# Patient Record
Sex: MALE | Race: WHITE | NOT HISPANIC OR LATINO | Employment: UNEMPLOYED | URBAN - METROPOLITAN AREA
[De-identification: names, ages, dates, MRNs, and addresses within clinical notes are randomized per-mention and may not be internally consistent; named-entity substitution may affect disease eponyms.]

---

## 2021-10-14 ENCOUNTER — HOSPITAL ENCOUNTER (OUTPATIENT)
Dept: RADIOLOGY | Facility: MEDICAL CENTER | Age: 46
End: 2021-10-14
Attending: ORTHOPAEDIC SURGERY
Payer: MEDICARE

## 2021-10-14 DIAGNOSIS — Q66.89 CLUBFOOT OF BOTH LOWER EXTREMITIES: ICD-10-CM

## 2021-10-14 DIAGNOSIS — M25.571 CHRONIC PAIN OF RIGHT ANKLE: ICD-10-CM

## 2021-10-14 DIAGNOSIS — G89.29 CHRONIC PAIN OF RIGHT ANKLE: ICD-10-CM

## 2021-10-14 PROCEDURE — 73721 MRI JNT OF LWR EXTRE W/O DYE: CPT | Mod: RT,MG

## 2021-10-14 PROCEDURE — 73700 CT LOWER EXTREMITY W/O DYE: CPT | Mod: RT,MG

## 2021-10-18 PROBLEM — Q66.89 CLUBFOOT OF RIGHT LOWER EXTREMITY: Status: ACTIVE | Noted: 2021-10-18

## 2021-10-26 ENCOUNTER — APPOINTMENT (OUTPATIENT)
Dept: ADMISSIONS | Facility: MEDICAL CENTER | Age: 46
End: 2021-10-26
Attending: ORTHOPAEDIC SURGERY
Payer: MEDICARE

## 2021-10-27 NOTE — DISCHARGE PLANNING
DISCHARGE PLANNING NOTE -    Procedure: Procedure(s):  ARTHROSCOPY, ANKLE  OSTEOTOMY-TIBIA/TALUS EXICISON, CALCANEAL SLIDE, TALONAVICULAR, CALCANEALCUBOID  BONE GRAFT- AND ALLOGRAFT  LENGTHENING, TENDON-TENDOACHILLES LENGHTENING  REMOVAL, HARDWARE-SUBTALAR/TALONAVICULAR/CALCANEALCUBOID JOINTS  Procedure Date: 11/9/2021  Insurance: Payor: MEDICARE / Plan: MEDICARE PART A & B / Product Type: *No Product type* /    Equipment currently available at home?  front-wheel walker  Steps into the home? 2  Steps within the home? 0  Toilet height? ADA  Type of shower? tub-shower  Who will be with you during your recovery? spouse  Is Outpatient Physical Therapy set up after surgery? No   Planning same day discharge?Yes     Plan: RN CM met with patient for preadmission appointment. He states he is unsure how long he may be NWB, but reports could be 2-6 weeks. RN CM suggested knee scooter, but patient states that he has been NWB before and feels most comfortable using the walker he has at home because of his balance issues. Equipment list and home safety checklist reviewed with patient. Anticipate patient will discharge home with spouse.

## 2021-10-27 NOTE — H&P (VIEW-ONLY)
Subjective   Patient ID:  Irene Figueroa is a 45 y.o. male.    Chief Complaint:  Follow-Up of the Right Ankle    Last Surgery: No surgery found on No surgery found    HPI Henrique is here for preoperative visit, he does have some questions about surgery.  He would like to have his great MTP plate out as well.  No other significant changes.    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  Neurovascularly intact throughout.  He does have lateral instability.    Last Imaging Result(s):   MR-ANKLE W/O RIGHT  Narrative: 10/14/2021 5:05 PM    HISTORY/REASON FOR EXAM: Surgical repairs remotely for clubfoot. pain; metal in bilateral feet/ankles.    TECHNIQUE/EXAM DESCRIPTION:  MRI of the RIGHT ankle without contrast.    Using a [employer] Signa 1.5 Denise MRI scanner, T1 axial and sagittal, fast spin-echo T2 fat-suppressed axial and coronal, and fast inversion recovery sagittal images were obtained.    COMPARISON: CT same day    FINDINGS:  There is some artifact related to hindfoot fusion hardware with mature subtalar, calcaneal cuboid and talonavicular fusion confirmed.    Osseous structures/cartilaginous surfaces: There is severe tibiotalar cartilage thinning medially, moderate laterally. There is subchondral edema and cyst formation in the tibial plafond. There are moderate tibiotalar spurs which would be expected to   limit range of motion with dorsi flexion. There is a small anterior ossific loose body, 4 mm    Ligaments/tendons: The anterior talofibular, calcaneofibular, and posterior talofibular ligaments are intact. The deltoid ligament is intact.    The extensor tendons, tibialis posterior, flexor digitorum longus, flexor hallucis longus, and peroneal tendons are intact but there is fatty atrophy of the flexor tendons.    The Achilles' tendon is intact but there is mild fusiform noninsertional thickening  Impression: Subtalar, calcaneocuboid and talonavicular  mature fusion with hardware in place    Moderate severity tibiotalar osteoarthritis    Muscular fatty atrophy affecting the flexors of the ankle    Mild Achilles tendinopathy      Assessment & Plan   Encounter Diagnoses:   No diagnosis found.    No orders of the defined types were placed in this encounter.    Very pleasant 45-year-old gentleman we have plans for a cavus reconstruction with Calc slide osteotomy and derotation through his midfoot.  He would also like to have all hardware removed including his great MTP plate.  I think partial excision of the talus laterally with lateral ligament reconstruction is indicated as well.  He is scheduled for November 9.  I am looking forward to seeing him at that time.  Return for Post-Op, Imaging.

## 2021-11-05 ENCOUNTER — PRE-ADMISSION TESTING (OUTPATIENT)
Dept: ADMISSIONS | Facility: MEDICAL CENTER | Age: 46
End: 2021-11-05
Attending: ORTHOPAEDIC SURGERY
Payer: MEDICARE

## 2021-11-05 DIAGNOSIS — Z01.812 PRE-OPERATIVE LABORATORY EXAMINATION: ICD-10-CM

## 2021-11-05 PROCEDURE — U0003 INFECTIOUS AGENT DETECTION BY NUCLEIC ACID (DNA OR RNA); SEVERE ACUTE RESPIRATORY SYNDROME CORONAVIRUS 2 (SARS-COV-2) (CORONAVIRUS DISEASE [COVID-19]), AMPLIFIED PROBE TECHNIQUE, MAKING USE OF HIGH THROUGHPUT TECHNOLOGIES AS DESCRIBED BY CMS-2020-01-R: HCPCS

## 2021-11-05 PROCEDURE — U0005 INFEC AGEN DETEC AMPLI PROBE: HCPCS

## 2021-11-06 LAB
COVID ORDER STATUS COVID19: NORMAL
SARS-COV-2 RNA RESP QL NAA+PROBE: NOTDETECTED
SPECIMEN SOURCE: NORMAL

## 2021-11-09 ENCOUNTER — HOSPITAL ENCOUNTER (OUTPATIENT)
Facility: MEDICAL CENTER | Age: 46
End: 2021-11-09
Attending: ORTHOPAEDIC SURGERY | Admitting: ORTHOPAEDIC SURGERY
Payer: MEDICARE

## 2021-11-09 ENCOUNTER — ANESTHESIA EVENT (OUTPATIENT)
Dept: SURGERY | Facility: MEDICAL CENTER | Age: 46
End: 2021-11-09
Payer: MEDICARE

## 2021-11-09 ENCOUNTER — APPOINTMENT (OUTPATIENT)
Dept: RADIOLOGY | Facility: MEDICAL CENTER | Age: 46
End: 2021-11-09
Attending: ORTHOPAEDIC SURGERY
Payer: MEDICARE

## 2021-11-09 ENCOUNTER — ANESTHESIA (OUTPATIENT)
Dept: SURGERY | Facility: MEDICAL CENTER | Age: 46
End: 2021-11-09
Payer: MEDICARE

## 2021-11-09 VITALS
BODY MASS INDEX: 30.56 KG/M2 | HEART RATE: 68 BPM | RESPIRATION RATE: 21 BRPM | HEIGHT: 69 IN | DIASTOLIC BLOOD PRESSURE: 71 MMHG | SYSTOLIC BLOOD PRESSURE: 122 MMHG | TEMPERATURE: 97.5 F | WEIGHT: 206.35 LBS | OXYGEN SATURATION: 97 %

## 2021-11-09 DIAGNOSIS — Q66.89 CLUBFOOT OF RIGHT LOWER EXTREMITY: ICD-10-CM

## 2021-11-09 PROCEDURE — 28300 INCISION OF HEEL BONE: CPT | Mod: 59 | Performed by: ORTHOPAEDIC SURGERY

## 2021-11-09 PROCEDURE — 64447 NJX AA&/STRD FEMORAL NRV IMG: CPT | Performed by: ORTHOPAEDIC SURGERY

## 2021-11-09 PROCEDURE — 500881 HCHG PACK, EXTREMITY: Performed by: ORTHOPAEDIC SURGERY

## 2021-11-09 PROCEDURE — 27680 RELEASE OF LOWER LEG TENDON: CPT | Mod: 59 | Performed by: ORTHOPAEDIC SURGERY

## 2021-11-09 PROCEDURE — 28260 RELEASE OF MIDFOOT JOINT: CPT | Mod: ASROC | Performed by: NURSE PRACTITIONER

## 2021-11-09 PROCEDURE — C1713 ANCHOR/SCREW BN/BN,TIS/BN: HCPCS | Performed by: ORTHOPAEDIC SURGERY

## 2021-11-09 PROCEDURE — 28306 INCISION OF METATARSAL: CPT | Mod: ASROC,59 | Performed by: NURSE PRACTITIONER

## 2021-11-09 PROCEDURE — 28309 INCISION OF METATARSALS: CPT | Performed by: ORTHOPAEDIC SURGERY

## 2021-11-09 PROCEDURE — 160029 HCHG SURGERY MINUTES - 1ST 30 MINS LEVEL 4: Performed by: ORTHOPAEDIC SURGERY

## 2021-11-09 PROCEDURE — 160047 HCHG PACU  - EA ADDL 30 MINS PHASE II: Performed by: ORTHOPAEDIC SURGERY

## 2021-11-09 PROCEDURE — 160002 HCHG RECOVERY MINUTES (STAT): Performed by: ORTHOPAEDIC SURGERY

## 2021-11-09 PROCEDURE — 64445 NJX AA&/STRD SCIATIC NRV IMG: CPT | Performed by: ORTHOPAEDIC SURGERY

## 2021-11-09 PROCEDURE — A9270 NON-COVERED ITEM OR SERVICE: HCPCS | Performed by: ANESTHESIOLOGY

## 2021-11-09 PROCEDURE — 28304 INCISION OF MIDFOOT BONES: CPT | Mod: ASROC | Performed by: NURSE PRACTITIONER

## 2021-11-09 PROCEDURE — 27612 EXPLORATION OF ANKLE JOINT: CPT | Mod: 59 | Performed by: ORTHOPAEDIC SURGERY

## 2021-11-09 PROCEDURE — 700111 HCHG RX REV CODE 636 W/ 250 OVERRIDE (IP): Performed by: ANESTHESIOLOGY

## 2021-11-09 PROCEDURE — 28304 INCISION OF MIDFOOT BONES: CPT | Performed by: ORTHOPAEDIC SURGERY

## 2021-11-09 PROCEDURE — 160046 HCHG PACU - 1ST 60 MINS PHASE II: Performed by: ORTHOPAEDIC SURGERY

## 2021-11-09 PROCEDURE — 28309 INCISION OF METATARSALS: CPT | Mod: ASROC | Performed by: NURSE PRACTITIONER

## 2021-11-09 PROCEDURE — 700102 HCHG RX REV CODE 250 W/ 637 OVERRIDE(OP): Performed by: ANESTHESIOLOGY

## 2021-11-09 PROCEDURE — 29898 ANKLE ARTHROSCOPY/SURGERY: CPT | Mod: ASROC,RT | Performed by: NURSE PRACTITIONER

## 2021-11-09 PROCEDURE — 27612 EXPLORATION OF ANKLE JOINT: CPT | Mod: ASROC,59 | Performed by: NURSE PRACTITIONER

## 2021-11-09 PROCEDURE — 700101 HCHG RX REV CODE 250: Performed by: ORTHOPAEDIC SURGERY

## 2021-11-09 PROCEDURE — 28260 RELEASE OF MIDFOOT JOINT: CPT | Performed by: ORTHOPAEDIC SURGERY

## 2021-11-09 PROCEDURE — 27606 INCISION OF ACHILLES TENDON: CPT | Mod: 59 | Performed by: ORTHOPAEDIC SURGERY

## 2021-11-09 PROCEDURE — 28302 INCISION OF ANKLE BONE: CPT | Mod: ASROC | Performed by: NURSE PRACTITIONER

## 2021-11-09 PROCEDURE — 20902 REMOVAL OF BONE FOR GRAFT: CPT | Performed by: ORTHOPAEDIC SURGERY

## 2021-11-09 PROCEDURE — 29891 ARTHR ANK OSTCHN DF TAL&/TIB: CPT | Mod: ASROC | Performed by: NURSE PRACTITIONER

## 2021-11-09 PROCEDURE — 28306 INCISION OF METATARSAL: CPT | Mod: 59 | Performed by: ORTHOPAEDIC SURGERY

## 2021-11-09 PROCEDURE — 160048 HCHG OR STATISTICAL LEVEL 1-5: Performed by: ORTHOPAEDIC SURGERY

## 2021-11-09 PROCEDURE — 28302 INCISION OF ANKLE BONE: CPT | Performed by: ORTHOPAEDIC SURGERY

## 2021-11-09 PROCEDURE — 160041 HCHG SURGERY MINUTES - EA ADDL 1 MIN LEVEL 4: Performed by: ORTHOPAEDIC SURGERY

## 2021-11-09 PROCEDURE — 160025 RECOVERY II MINUTES (STATS): Performed by: ORTHOPAEDIC SURGERY

## 2021-11-09 PROCEDURE — 27640 PARTIAL REMOVAL OF TIBIA: CPT | Performed by: ORTHOPAEDIC SURGERY

## 2021-11-09 PROCEDURE — 28300 INCISION OF HEEL BONE: CPT | Mod: ASROC,59 | Performed by: NURSE PRACTITIONER

## 2021-11-09 PROCEDURE — 8968 PR NO CHARGE - PROCEDURE: Mod: ASROC,59 | Performed by: NURSE PRACTITIONER

## 2021-11-09 PROCEDURE — 20902 REMOVAL OF BONE FOR GRAFT: CPT | Mod: ASROC | Performed by: NURSE PRACTITIONER

## 2021-11-09 PROCEDURE — 27698 REPAIR OF ANKLE LIGAMENT: CPT | Performed by: ORTHOPAEDIC SURGERY

## 2021-11-09 PROCEDURE — 502000 HCHG MISC OR IMPLANTS RC 0278: Performed by: ORTHOPAEDIC SURGERY

## 2021-11-09 PROCEDURE — 27698 REPAIR OF ANKLE LIGAMENT: CPT | Mod: ASROC | Performed by: NURSE PRACTITIONER

## 2021-11-09 PROCEDURE — A6223 GAUZE >16<=48 NO W/SAL W/O B: HCPCS | Performed by: ORTHOPAEDIC SURGERY

## 2021-11-09 PROCEDURE — 160035 HCHG PACU - 1ST 60 MINS PHASE I: Performed by: ORTHOPAEDIC SURGERY

## 2021-11-09 PROCEDURE — 29898 ANKLE ARTHROSCOPY/SURGERY: CPT | Mod: RT | Performed by: ORTHOPAEDIC SURGERY

## 2021-11-09 PROCEDURE — 700105 HCHG RX REV CODE 258: Performed by: ORTHOPAEDIC SURGERY

## 2021-11-09 PROCEDURE — 502240 HCHG MISC OR SUPPLY RC 0272: Performed by: ORTHOPAEDIC SURGERY

## 2021-11-09 PROCEDURE — 29891 ARTHR ANK OSTCHN DF TAL&/TIB: CPT | Performed by: ORTHOPAEDIC SURGERY

## 2021-11-09 PROCEDURE — 160009 HCHG ANES TIME/MIN: Performed by: ORTHOPAEDIC SURGERY

## 2021-11-09 PROCEDURE — 501838 HCHG SUTURE GENERAL: Performed by: ORTHOPAEDIC SURGERY

## 2021-11-09 PROCEDURE — 73610 X-RAY EXAM OF ANKLE: CPT | Mod: RT

## 2021-11-09 PROCEDURE — 28110 PART REMOVAL OF METATARSAL: CPT | Performed by: ORTHOPAEDIC SURGERY

## 2021-11-09 PROCEDURE — 700111 HCHG RX REV CODE 636 W/ 250 OVERRIDE (IP): Performed by: ORTHOPAEDIC SURGERY

## 2021-11-09 PROCEDURE — 20680 REMOVAL OF IMPLANT DEEP: CPT | Mod: ASROC,59 | Performed by: NURSE PRACTITIONER

## 2021-11-09 PROCEDURE — 20680 REMOVAL OF IMPLANT DEEP: CPT | Mod: 59 | Performed by: ORTHOPAEDIC SURGERY

## 2021-11-09 PROCEDURE — A6454 SELF-ADHER BAND W>=3" <5"/YD: HCPCS | Performed by: ORTHOPAEDIC SURGERY

## 2021-11-09 PROCEDURE — 700101 HCHG RX REV CODE 250: Performed by: ANESTHESIOLOGY

## 2021-11-09 PROCEDURE — 28120 PART REMOVAL OF ANKLE/HEEL: CPT | Mod: 59 | Performed by: ORTHOPAEDIC SURGERY

## 2021-11-09 DEVICE — IMPLANTABLE DEVICE: Type: IMPLANTABLE DEVICE | Site: ANKLE | Status: FUNCTIONAL

## 2021-11-09 DEVICE — SUTURE ANCHOR TWINFIX 3.5 WITH NEEDLES SMALL JOINT: Type: IMPLANTABLE DEVICE | Site: ANKLE | Status: FUNCTIONAL

## 2021-11-09 RX ORDER — OXYCODONE HCL 5 MG/5 ML
10 SOLUTION, ORAL ORAL
Status: COMPLETED | OUTPATIENT
Start: 2021-11-09 | End: 2021-11-09

## 2021-11-09 RX ORDER — CELECOXIB 200 MG/1
200 CAPSULE ORAL ONCE
Status: DISCONTINUED | OUTPATIENT
Start: 2021-11-09 | End: 2021-11-09 | Stop reason: HOSPADM

## 2021-11-09 RX ORDER — MIDAZOLAM HYDROCHLORIDE 1 MG/ML
INJECTION INTRAMUSCULAR; INTRAVENOUS PRN
Status: DISCONTINUED | OUTPATIENT
Start: 2021-11-09 | End: 2021-11-09 | Stop reason: SURG

## 2021-11-09 RX ORDER — SODIUM CHLORIDE, SODIUM LACTATE, POTASSIUM CHLORIDE, CALCIUM CHLORIDE 600; 310; 30; 20 MG/100ML; MG/100ML; MG/100ML; MG/100ML
INJECTION, SOLUTION INTRAVENOUS CONTINUOUS
Status: ACTIVE | OUTPATIENT
Start: 2021-11-09 | End: 2021-11-09

## 2021-11-09 RX ORDER — CEFAZOLIN SODIUM 1 G/3ML
2 INJECTION, POWDER, FOR SOLUTION INTRAMUSCULAR; INTRAVENOUS ONCE
Status: COMPLETED | OUTPATIENT
Start: 2021-11-09 | End: 2021-11-09

## 2021-11-09 RX ORDER — ALBUTEROL SULFATE 5 MG/ML
2.5 SOLUTION RESPIRATORY (INHALATION)
Status: DISCONTINUED | OUTPATIENT
Start: 2021-11-09 | End: 2021-11-09 | Stop reason: HOSPADM

## 2021-11-09 RX ORDER — DEXAMETHASONE SODIUM PHOSPHATE 4 MG/ML
INJECTION, SOLUTION INTRA-ARTICULAR; INTRALESIONAL; INTRAMUSCULAR; INTRAVENOUS; SOFT TISSUE PRN
Status: DISCONTINUED | OUTPATIENT
Start: 2021-11-09 | End: 2021-11-09 | Stop reason: SURG

## 2021-11-09 RX ORDER — ACETAMINOPHEN 500 MG
1000 TABLET ORAL ONCE
Status: DISCONTINUED | OUTPATIENT
Start: 2021-11-09 | End: 2021-11-09 | Stop reason: HOSPADM

## 2021-11-09 RX ORDER — MAGNESIUM HYDROXIDE 1200 MG/15ML
LIQUID ORAL
Status: COMPLETED | OUTPATIENT
Start: 2021-11-09 | End: 2021-11-09

## 2021-11-09 RX ORDER — OXYCODONE HCL 5 MG/5 ML
5 SOLUTION, ORAL ORAL
Status: COMPLETED | OUTPATIENT
Start: 2021-11-09 | End: 2021-11-09

## 2021-11-09 RX ORDER — ONDANSETRON 2 MG/ML
INJECTION INTRAMUSCULAR; INTRAVENOUS PRN
Status: DISCONTINUED | OUTPATIENT
Start: 2021-11-09 | End: 2021-11-09 | Stop reason: SURG

## 2021-11-09 RX ORDER — ONDANSETRON 2 MG/ML
4 INJECTION INTRAMUSCULAR; INTRAVENOUS
Status: DISCONTINUED | OUTPATIENT
Start: 2021-11-09 | End: 2021-11-09 | Stop reason: HOSPADM

## 2021-11-09 RX ORDER — KETOROLAC TROMETHAMINE 30 MG/ML
INJECTION, SOLUTION INTRAMUSCULAR; INTRAVENOUS PRN
Status: DISCONTINUED | OUTPATIENT
Start: 2021-11-09 | End: 2021-11-09 | Stop reason: SURG

## 2021-11-09 RX ORDER — TRANEXAMIC ACID 100 MG/ML
INJECTION, SOLUTION INTRAVENOUS PRN
Status: DISCONTINUED | OUTPATIENT
Start: 2021-11-09 | End: 2021-11-09 | Stop reason: SURG

## 2021-11-09 RX ORDER — MEPERIDINE HYDROCHLORIDE 25 MG/ML
6.25 INJECTION INTRAMUSCULAR; INTRAVENOUS; SUBCUTANEOUS
Status: DISCONTINUED | OUTPATIENT
Start: 2021-11-09 | End: 2021-11-09 | Stop reason: HOSPADM

## 2021-11-09 RX ORDER — HALOPERIDOL 5 MG/ML
1 INJECTION INTRAMUSCULAR
Status: DISCONTINUED | OUTPATIENT
Start: 2021-11-09 | End: 2021-11-09 | Stop reason: HOSPADM

## 2021-11-09 RX ORDER — DIPHENHYDRAMINE HYDROCHLORIDE 50 MG/ML
12.5 INJECTION INTRAMUSCULAR; INTRAVENOUS
Status: DISCONTINUED | OUTPATIENT
Start: 2021-11-09 | End: 2021-11-09 | Stop reason: HOSPADM

## 2021-11-09 RX ADMIN — SODIUM CHLORIDE, POTASSIUM CHLORIDE, SODIUM LACTATE AND CALCIUM CHLORIDE: 600; 310; 30; 20 INJECTION, SOLUTION INTRAVENOUS at 06:33

## 2021-11-09 RX ADMIN — FENTANYL CITRATE 50 MCG: 50 INJECTION, SOLUTION INTRAMUSCULAR; INTRAVENOUS at 08:20

## 2021-11-09 RX ADMIN — OXYCODONE HYDROCHLORIDE 10 MG: 5 SOLUTION ORAL at 11:22

## 2021-11-09 RX ADMIN — FENTANYL CITRATE 50 MCG: 50 INJECTION, SOLUTION INTRAMUSCULAR; INTRAVENOUS at 09:23

## 2021-11-09 RX ADMIN — FENTANYL CITRATE 50 MCG: 50 INJECTION, SOLUTION INTRAMUSCULAR; INTRAVENOUS at 08:59

## 2021-11-09 RX ADMIN — CEFAZOLIN 2 G: 330 INJECTION, POWDER, FOR SOLUTION INTRAMUSCULAR; INTRAVENOUS at 07:03

## 2021-11-09 RX ADMIN — FENTANYL CITRATE 50 MCG: 50 INJECTION, SOLUTION INTRAMUSCULAR; INTRAVENOUS at 09:48

## 2021-11-09 RX ADMIN — FENTANYL CITRATE 50 MCG: 50 INJECTION, SOLUTION INTRAMUSCULAR; INTRAVENOUS at 07:08

## 2021-11-09 RX ADMIN — FENTANYL CITRATE 50 MCG: 50 INJECTION, SOLUTION INTRAMUSCULAR; INTRAVENOUS at 10:21

## 2021-11-09 RX ADMIN — DEXAMETHASONE SODIUM PHOSPHATE 4 MG: 4 INJECTION, SOLUTION INTRA-ARTICULAR; INTRALESIONAL; INTRAMUSCULAR; INTRAVENOUS; SOFT TISSUE at 07:13

## 2021-11-09 RX ADMIN — PROPOFOL 200 MG: 10 INJECTION, EMULSION INTRAVENOUS at 07:03

## 2021-11-09 RX ADMIN — MIDAZOLAM HYDROCHLORIDE 2 MG: 1 INJECTION, SOLUTION INTRAMUSCULAR; INTRAVENOUS at 06:42

## 2021-11-09 RX ADMIN — ONDANSETRON 4 MG: 2 INJECTION INTRAMUSCULAR; INTRAVENOUS at 07:13

## 2021-11-09 RX ADMIN — FENTANYL CITRATE 50 MCG: 50 INJECTION, SOLUTION INTRAMUSCULAR; INTRAVENOUS at 07:03

## 2021-11-09 RX ADMIN — LIDOCAINE HYDROCHLORIDE 0.5 ML: 10 INJECTION, SOLUTION EPIDURAL; INFILTRATION; INTRACAUDAL; PERINEURAL at 06:33

## 2021-11-09 RX ADMIN — TRANEXAMIC ACID 1000 MG: 100 INJECTION, SOLUTION INTRAVENOUS at 10:16

## 2021-11-09 RX ADMIN — KETOROLAC TROMETHAMINE 30 MG: 30 INJECTION, SOLUTION INTRAMUSCULAR at 09:22

## 2021-11-09 RX ADMIN — FENTANYL CITRATE 50 MCG: 50 INJECTION, SOLUTION INTRAMUSCULAR; INTRAVENOUS at 11:23

## 2021-11-09 ASSESSMENT — PAIN SCALES - GENERAL: PAIN_LEVEL: 0

## 2021-11-09 ASSESSMENT — PAIN DESCRIPTION - PAIN TYPE: TYPE: SURGICAL PAIN

## 2021-11-09 NOTE — DISCHARGE INSTRUCTIONS
ACTIVITY: Rest and take it easy for the first 24 hours.  A responsible adult is recommended to remain with you during that time.  It is normal to feel sleepy.  We encourage you to not do anything that requires balance, judgment or coordination.    MILD FLU-LIKE SYMPTOMS ARE NORMAL. YOU MAY EXPERIENCE GENERALIZED MUSCLE ACHES, THROAT IRRITATION, HEADACHE AND/OR SOME NAUSEA.    FOR 24 HOURS DO NOT:  Drive, operate machinery or run household appliances.  Drink beer or alcoholic beverages.   Make important decisions or sign legal documents.    SPECIAL INSTRUCTIONS: non weight bearing. Ice and elevate.   Aspirin 81 mg twice a day for clot prevention.      DIET: To avoid nausea, slowly advance diet as tolerated, avoiding spicy or greasy foods for the first day.  Add more substantial food to your diet according to your physician's instructions.  Babies can be fed formula or breast milk as soon as they are hungry.  INCREASE FLUIDS AND FIBER TO AVOID CONSTIPATION.    SURGICAL DRESSING/BATHING: *keep clean and dry**    FOLLOW-UP APPOINTMENT:  A follow-up appointment should be arranged with your doctor in ; call to schedule.    You should CALL YOUR PHYSICIAN if you develop:  Fever greater than 101 degrees F.  Pain not relieved by medication, or persistent nausea or vomiting.  Excessive bleeding (blood soaking through dressing) or unexpected drainage from the wound.  Extreme redness or swelling around the incision site, drainage of pus or foul smelling drainage.  Inability to urinate or empty your bladder within 8 hours.  Problems with breathing or chest pain.    You should call 911 if you develop problems with breathing or chest pain.  If you are unable to contact your doctor or surgical center, you should go to the nearest emergency room or urgent care center.  Physician's telephone #: *[phone]**    If any questions arise, call your doctor.  If your doctor is not available, please feel free to call the Surgical Center  at [phone]. The Contact Center is open Monday through Friday 7AM to 5PM and may speak to a nurse at [phone], or toll free at [phone].     A registered nurse may call you a few days after your surgery to see how you are doing after your procedure.    MEDICATIONS: Resume taking daily medication.  Take prescribed pain medication with food.  If no medication is prescribed, you may take non-aspirin pain medication if needed.  PAIN MEDICATION CAN BE VERY CONSTIPATING.  Take a stool softener or laxative such as senokot, pericolace, or milk of magnesia if needed.    Prescription given for **oxycodone*.  Last pain medication given at *3102**.    If your physician has prescribed pain medication that includes Acetaminophen (Tylenol), do not take additional Acetaminophen (Tylenol) while taking the prescribed medication.    Depression / Suicide Risk    As you are discharged from this [facility] facility, it is important to learn how to keep safe from harming yourself.    Recognize the warning signs:  · Abrupt changes in personality, positive or negative- including increase in energy   · Giving away possessions  · Change in eating patterns- significant weight changes-  positive or negative  · Change in sleeping patterns- unable to sleep or sleeping all the time   · Unwillingness or inability to communicate  · Depression  · Unusual sadness, discouragement and loneliness  · Talk of wanting to die  · Neglect of personal appearance   · Rebelliousness- reckless behavior  · Withdrawal from people/activities they love  · Confusion- inability to concentrate     If you or a loved one observes any of these behaviors or has concerns about self-harm, here's what you can do:  · Talk about it- your feelings and reasons for harming yourself  · Remove any means that you might use to hurt yourself (examples: pills, rope, extension cords, firearm)  · Get professional help from the community (Mental Health, Substance Abuse,  psychological counseling)  · Do not be alone:Call your Safe Contact- someone whom you trust who will be there for you.  · Call your local CRISIS HOTLINE 222-0802 or 310-040-1114  · Call your local Children's Mobile Crisis Response Team [place] (765) 579-5656 or www.[employer]  · Call the toll free National Suicide Prevention Hotlines   · National Suicide Prevention Lifeline 371-046-TFXQ (8160)  · National Hope Line Network 800-SUICIDE (512-1926)

## 2021-11-09 NOTE — OR NURSING
Received from pacu at 1211 and home at 1315.  Vss. Toes warm and pink.  Cast intact.  Taking po well and able to urinate.  Verbalized understanding of dc instructions. Home.

## 2021-11-09 NOTE — LETTER
October 21, 2021    Patient Name: Irene Figueroa  Surgeon Name: Mario Antoine M.D.  Surgery Facility: [facility] ([facility])  Surgery Date: 11/9/2021    The time of your surgery is not final and may change up to and until the day of your surgery. You will be contacted 24-48 hours prior to your surgery date with your check-in and surgery time.    If you will not be at one of the below numbers please call/text the surgery scheduler at [phone]  Preferred Phone: [phone]    BEFORE YOUR SURGERY  Pre Registration and/or Lab Work must be done within and no earlier than 28 days prior to your surgery date. Please call [facility] at [phone] for an appointment as soon as possible.     COVID testing needs to be done 4-7 days prior to surgery, failure to do so can result in surgery being cancelled.    Pre op Appointment:    Instructions: Bring a list of all medications you are taking including the dosing and frequency.    - Your Post-Op Packet and necessary DME will be available for pick-up the day prior to surgery. Please let your provider's MA know at [phone] if you want to pick-up your prescriptions prior to surgery, hand written narcotics must be filled in [place]. If you do not  the prescription prior to surgery you will receive it at surgery.    Please refrain from smoking any substance after midnight prior to surgery. Smoking may interfere with the anesthetic and frequently produces nausea during the recovery period.    Continue taking all lifesaving medications. Including the morning of your surgery with small sip of water.    Please read the MEDICATION INSTRUCTIONS below completely.    DAY OF YOUR SURGERY  Nothing to eat or drink after midnight     Please arrive at the hospital/surgery center at the check-in time provided.     An adult will need to bring you and take you home after your surgery.     AFTER YOUR SURGERY  Post op  Appointment:   Date: 11/22/21   Time: 10:15AM   With: Surgeon(s):  Mario Antoine M.D.   Location: [address] [place] [id number]    TIME OFF WORK  FMLA or Disability forms can be faxed directly to: [phone] or you may drop them off at [address] [place] [address]. Our office charges a $35.00 fee per form. Forms will be completed within 10 business days of drop off and payment received. For the status of your forms you may contact our disability office directly [phone].    MEDICATION INSTRUCTIONS  The following medications should be stopped a minimum of 10 days prior to surgery:  All over the counter, Supplements & Herbal medications    Anorectics: Phentermine (Adipex-P, Lomaira and Suprenza), Phentermine-topiramate (Qsymia), Bupropion-naltrexone (Contrave)    Opiod Partial Agonists/Opioid Antagonists: Buprenorphine (Subocone, Belbuca, Butrans, Probuphine Implant, Sublocade), Naltrexone (ReVia, Vivitrol), Naloxone    Amphetamines: Dextroamphetamine/Amphetamine (Adderall, Mydayis), Methylphenidate Hydrochloride (Concerta, Metadate, Methylin, Ritalin)    The following medications should be stopped 5 days prior to surgery:  Blood Thinners: Any Aspirin, Aspirin products, anti-inflammatories such as ibuprofen and any blood thinners such as Coumadin and Plavix. Please consult your prescribing physician if you are on life saving blood thinners, in regards to when to stop medications prior to surgery.     The following medications should be stopped a minimum of 3 days prior to surgery:  PDE-5 inhibitors: Sildenafil (Viagra), Tadalafil (Cialis), Vardenafil (Levitra), Avanafil (Stendra)    MAO Inhibitors: Rasagiline (Azilect), Selegiline (Eldepryl, Emsam, Selapar), Isocarboxazid (Marplan), Phenelzine (Nardil)

## 2021-11-09 NOTE — ANESTHESIA PREPROCEDURE EVALUATION
Case: [id number] Date/Time: 11/09/21 0645    Procedures:       ARTHROSCOPY, ANKLE (Right )      OSTEOTOMY-TIBIA/TALUS EXICISON, CALCANEAL SLIDE, TALONAVICULAR, CALCANEALCUBOID      BONE GRAFT- AND ALLOGRAFT      LENGTHENING, TENDON-TENDOACHILLES LENGHTENING      REMOVAL, HARDWARE-SUBTALAR/TALONAVICULAR/CALCANEALCUBOID JOINTS      RECONSTRUCTION, LIGAMENT    Diagnosis: Clubfoot of right lower extremity [Q66.89]    Pre-op diagnosis: Clubfoot of right lower extremity [Q66.89]    Location: [place] [address] / SURGERY [facility]    Surgeons: Mario Antoine M.D.          Relevant Problems   No relevant active problems       Physical Exam    Airway   Mallampati: II  TM distance: >3 FB  Neck ROM: full       Cardiovascular - normal exam  Rhythm: regular  Rate: normal  (-) murmur     Dental - normal exam           Pulmonary - normal exam  Breath sounds clear to auscultation     Abdominal    Neurological - normal exam                 Anesthesia Plan    ASA 2       Plan - general and peripheral nerve block     Peripheral nerve block will be post-op pain control  Airway plan will be LMA          Induction: intravenous    Postoperative Plan: Postoperative administration of opioids is intended.    Pertinent diagnostic labs and testing reviewed    Informed Consent:    Anesthetic plan and risks discussed with patient.    Use of blood products discussed with: patient whom consented to blood products.

## 2021-11-09 NOTE — ANESTHESIA PROCEDURE NOTES
Peripheral Block    Date/Time: 11/9/2021 6:42 AM  Performed by: Jack Barnes M.D.  Authorized by: Jack Barnes M.D.     Patient Location:  Pre-op  Start Time:  11/9/2021 6:42 AM  End Time:  11/9/2021 6:48 AM  Reason for Block: at surgeon's request and post-op pain management ONLY    patient identified, IV checked, site marked, risks and benefits discussed, surgical consent, monitors and equipment checked, pre-op evaluation and timeout performed    Patient Position:  Right lateral decubitus  Prep: ChloraPrep    Block Region:  Lower Extremity  Lower Extremity - Block Type:  Selective FEMORAL nerve block at the Adductor Canal and Sciatic, pos/sub - SCIATIC nerve block, posterior or sub-gluteal approach    Laterality:  Right  Procedures: ultrasound guided  Image captured, interpreted and electronically stored.    Block Type:  Single-shot  Needle Length:  100mm  Needle Gauge:  21 G  Injection Assessment:  Negative aspiration for heme, no paresthesia on injection and incremental injection   US Guided Selective Femoral Nerve Block at Adductor Canal:   US probe placed at mid-thigh level on externally rotated leg and femur identified.  Probe directed medially until Sartorius Muscle (SM), Femoral Artery (FA) and Saphenous Nerve (SN) identified in Adductor Canal (AC).  Needle inserted anterolateral to probe in an in plane approach into a subsartorial perivascular perineural position.  After negative aspiration LA injected with ease and visualized spreading within the AC.  US Guided Sciatic Nerve Block   US probe placed several cm proximal to popliteal crease on posterior thigh and scanned caudad and cephalad until Sciatic Nerve (SN) identified superficial/lateral to popliteal artery.  Needle inserted lateral to probe in an in plane approach under direct visualization to a perineural position.  After negative aspiration LA injected with ease and visualized surrounding the SN.   30 cc 0.5% ropivicaine for sciatic  block, 10 cc 0.5% bupivicaine for adductor canal

## 2021-11-09 NOTE — ANESTHESIA PROCEDURE NOTES
Airway    Date/Time: 11/9/2021 7:03 AM  Performed by: Jack Barnes M.D.  Authorized by: Jack Barnes M.D.     Location:  OR  Urgency:  Elective  Indications for Airway Management:  Anesthesia      Spontaneous Ventilation: absent    Sedation Level:  Deep  Preoxygenated: Yes    Final Airway Type:  Supraglottic airway  Final Supraglottic Airway:  Standard LMA    SGA Size:  4  Number of Attempts at Approach:  1

## 2021-11-09 NOTE — ANESTHESIA TIME REPORT
Anesthesia Start and Stop Event Times     Date Time Event    11/9/2021 0656 Ready for Procedure     0700 Anesthesia Start     1056 Anesthesia Stop        Responsible Staff  11/09/21    Name Role Begin End    Jack Barnes M.D. Anesth 0700 1056        Preop Diagnosis (Free Text):  Pre-op Diagnosis     Clubfoot of right lower extremity [Q66.89]        Preop Diagnosis (Codes):  Diagnosis Information     Diagnosis Code(s): Clubfoot of right lower extremity [Q66.89]        Premium Reason  Non-Premium    Comments:

## 2021-11-09 NOTE — OR NURSING
1055: Pt arrived from OR post R ankle arthroscopy under anethesia. Pt is drowsy, but responds to RN voice and is easily reoriented to time and location. R ankle dressing is CDI. Cardiac rhythm appears to be  SR. Pt denies nausea. Pt reports no pain. Pt able to move L foot, but not his R toes; pt can lift his R leg. Nerve block from anesthesia so this is expected. R foot toes are warm, pink, and cap refill is less than 2 seconds. Pt tolerating water.     1130: Updated pt's family member, Giuliana.    1142: Belongings returned to pt. Pt using cell phone. Breathing is unlabored. Pt appears comfortable and reports the pain medication is working.     1200: Report to TIFFANY Santos. Pt [facility] room 25 via cristy with RN.

## 2021-11-09 NOTE — ANESTHESIA POSTPROCEDURE EVALUATION
Patient: Irene Figueroa    Procedure Summary     Date: 11/09/21 Room / Location: [place] [address] / SURGERY [facility]    Anesthesia Start: 0700 Anesthesia Stop: 1056    Procedures:       ARTHROSCOPY, ANKLE (Right Ankle)      OSTEOTOMY-TIBIA/TALUS EXICISON, CALCANEAL SLIDE, TALONAVICULAR, CALCANEALCUBOID, DISTAL METATARSAL OSTEOTOMIES, FIFTH METATARSAL OSTEOMTOMY FOR BUNIONETTE (Right Ankle)      BONE GRAFT- AND ALLOGRAFT (Right Ankle)      LENGTHENING, TENDON-TENDOACHILLES LENGHTENING, PERONEAL AND BREVIS LONGUS REPAIRS (Right Ankle)      REMOVAL, HARDWARE-SUBTALAR/TALONAVICULAR/CALCANEALCUBOID,GREAT METATARSALPHALANGEAL JOINTS (Right Ankle)      RECONSTRUCTION, LIGAMENT CALCANEOFIBULAR (Right Ankle) Diagnosis:       Clubfoot of right lower extremity      (Clubfoot of right lower extremity [Q66.89])    Surgeons: Mario Antoine M.D. Responsible Provider: Jack Barnes M.D.    Anesthesia Type: general, peripheral nerve block ASA Status: 2          Final Anesthesia Type: general, peripheral nerve block  Last vitals  BP   Blood Pressure: 114/69    Temp   36.3 °C (97.3 °F)    Pulse   71   Resp   18    SpO2   94 %      Anesthesia Post Evaluation    Patient location during evaluation: PACU  Patient participation: complete - patient participated  Level of consciousness: awake and alert  Pain score: 0    Airway patency: patent  Anesthetic complications: no  Cardiovascular status: hemodynamically stable  Respiratory status: acceptable  Hydration status: euvolemic    PONV: none          No complications documented.     Nurse Pain Score: 3 (NPRS)

## 2021-11-19 NOTE — OP REPORT
DATE OF SERVICE:  11/09/2021     PREOPERATIVE DIAGNOSES:  1.  Right equinocavovarus contracture.  2.  Status post right triple fusion in malunion.  3.  Right ankle pain.  4.  Right distal tibial exostosis.  5.  Right dorsal talar neck exostosis.  6.  Right talar dome osteochondral defect.  7.  Right medial foot and ankle contracture.  8.  Right lateral ankle instability.  9.  Right metatarsalgia.  10.  Right bunionette.  11.  Right peroneus brevis tenosynovitis.  12.  Retained surgical hardware in calcaneus talonavicular joint,   calcaneocuboid joint, great metatarsophalangeal joint and across fifth toe.     POSTOPERATIVE DIAGNOSES:  1.  Right equinocavovarus contracture.  2.  Status post right triple fusion in malunion.  3.  Right ankle pain.  4.  Right distal tibial exostosis.  5.  Right dorsal talar neck exostosis.  6.  Right talar dome osteochondral defect.  7.  Right medial foot and ankle contracture.  8.  Right lateral ankle instability.  9.  Right metatarsalgia.  10.  Right bunionette.  11.  Right peroneus brevis tenosynovitis.  12.  Retained surgical hardware in calcaneus talonavicular joint,   calcaneocuboid joint, great metatarsophalangeal joint and across fifth toe.     PROCEDURES:  1.  Removal of hardware calcaneus through two separate incisions, everything   on the right.  2.  Removal of hardware, talonavicular joint.  3.  Removal of hardware, calcaneocuboid joint.  4.  Removal of hardware, metatarsophalangeal joint.  5.  Removal of hardware, fifth toe.  6.  Right ankle arthroscopy with extensive debridement.  7.  Right ankle arthroscopy with drilling of osteochondral defects in the   talus and the tibia.  8.  Excision of distal tibial osteophyte, open.  9.  Excision of dorsal talar neck osteophyte, open.  10.  Lateralizing calcaneal osteotomy with minimally invasive bur.  11.  Peroneus brevis and longus tenolysis.  12.  Right ATFL repair.  13.  Right CFL repair.  14.  Right osteotomy through the  calcaneus, calcaneocuboid joint, talar neck,   talonavicular malunion.  15.  Dorsiflexion osteotomy, first ray.  16.  Second, third, fourth and fifth distal metatarsal osteotomies using   minimally invasive bur.  17.  Right bunionette excision using a minimally invasive bur.  18.  Extensive medial foot and ankle release on the right.  19.  Right tendo-Achilles lengthening.  20.  Large bone autograft.  21.  Posterior capsular release of tibiotalar joint.     SURGEON:  Mario Antoine MD     ASSISTANT:  NOLAN Mcdermott     ANESTHESIA:  General with peripheral nerve block requested by me for   postoperative pain control.     ESTIMATED BLOOD LOSS:  150 mL.     TOURNIQUET TIME:  150 minutes at 250 mmHg.     IMPLANTS:  1.  [place] 7.0 headless compression screw x1.  2.  Kole 5.0 headless compression screws x3.  3.  A 15 mm x15 mm Kole compression staple.  4.  [employer] TwinFix anchor x1.     COMPLICATIONS:  None.     OUTCOME:  PACU in stable condition.     HISTORY OF PRESENT ILLNESS:  This is a pleasant 45-year-old gentleman who was   born with clubfoot bilaterally.  He had surgery in [place] where bilateral   triple fusions on the right was fused and significant equinocavovarus that has   worsened.  He has also worsened his ankle pain and decreased his range of   motion over the last 2 years.  He was indicated for the above-stated   procedure.  He was greeted in the preoperative holding area and identified by   name and medical record number.  The right lower extremity was marked.  Risks   of procedure including bleeding, infection, pain, malunion, nonunion,   neurovascular damage, continuation of symptoms and need for more surgery were   discussed and he provided written consent.     DESCRIPTION OF PROCEDURE:  He was taken to the operating room and placed on   the table in supine position.  Preoperative antibiotics were administered.    General anesthesia was induced.  A nonsterile tourniquet was placed  on his   right thigh.  Right lower extremity prepped and draped in the usual sterile   fashion.  An operative pause was taken where all present were in agreement   with patient identification, laterality, and procedure to be performed.  The   limb was elevated for 5 minutes and tourniquet raised to 250 mmHg.     The joint was insufflated with 10 mL sterile normal saline.  A medial portal   was made at the level of joint just medial to the tibialis anterior tendon.    Immediately seen was extensive arthrofibrotic tissue across the joint as well   as large osteophytes.  A lateral portal was established followed by a small   joint shaver.  We spent significant amount of time removing arthrofibrotic   tissue and osteochondral fragments.  A 15 blade was used to transect [place]'s   ligament, which was then removed.  We noted a large medial talar dome   osteochondral defect in the corresponding lesion on the talus.  We spent a   significant amount of time bringing these back to stable borders and   performing microfracture of each one.  Arthroscopic instruments were then   withdrawn.     We reopened an anteromedial incision and dissected carefully down to a plate.    There was a significant amount of scar tissue within it.  The plate and 4   screws were able to be removed.  We dissected anteriorly and laterally through   this incision and then performed an ankle arthrotomy.  Through this incision,   we used a minimally invasive raym to ramy down under direct radiographic   guidance, the dysmorphic neck of the talus as well as the distal tibia.  This   also took a significant amount of time, but I felt it necessary to improve the   range of motion and the reduction of the ankle. After this was performed, we   moved onto a tendo-Achilles lengthening, we made 3 separate half cuts in the   Achilles tendon, 2 medial and 1 lateral and gained about 15 degrees of   additional dorsiflexion with the knee in extension.  I felt this  was about our   limit given the dysmorphic appearance of his talus as well as his talar neck.    Each of these incisions was closed with a simple nylon.     I made an 8 cm curvilinear incision along the lateral aspect of the fibula,   curving anteriorly towards the fourth metatarsal base.  We dissected deeply   and this allowed access to the calcaneal cuboid plate, which was exposed. The   plate and 4 screws were able to be removed and the fusion was solid.  We   dissected posteriorly through this incision and evaluated the peroneal   tendons.  They were markedly incarcerated in scar, but I did not think had   significant tearing.  We circumferentially dissected each out and performed a   traction tenolysis of each. Through this incision, we dissected posterior to   the fibula and accessed the posterior aspect of the ankle joint.  We sharply   excised much of the posterior capsule in an effort to increase his   dorsiflexion, which allowed us an additional 5 degrees of dorsiflexion.  We   also noted lateral instability and elected to proceed with a lateral ligament   repair at the end of the case.     I made a separate incision over the metatarsophalangeal joint with blunt   dissection, carried around the EHL tendon and down to the plate.  Plate and   all screws were able to be removed and the fusion appeared solid.  This   incision was left open until the end of the case as we used it to perform a   dorsiflexion osteotomy.     We made an incision at the tip of the fifth toe and removed the single screw   that was spanning the entirety of the toe and was also loose.  The screw tract   was curetted.  The incision was irrigated and closed with 3-0 nylon.     We radiographically identified the two subtalar fusion screws, which were   headless and completely covered in bone.  We made two separate 1 cm incisions   over each after triangulating their position radiographically.    Radiographically again we used a curette to  access the screw heads, then   cannulated them with an appropriately sized wire.  They were then able to be   removed with the appropriate .  This allowed us to do our osteotomies   and improve the alignment of the foot. Through the lateral incision we used a   wedge-shaped minimally invasive ramy to perform a lateralizing calcaneal   osteotomy. We removed a lateral wedge of bone under direct radiographic   guidance.  We then closed down the lateral wedge and shifted the heel   laterally.  This was secured in improved alignment with a percutaneously   placed 7.0 headless compression screw.  We then used K-wires to widen out our   osteotomy through the mid foot.  We cut back through the calcaneus as well as   the calcaneocuboid joint and performed a dorsal closing wedge through the   talar neck and the talonavicular joint as well as the calcaneocuboid joint.    An oscillating saw was used to remove a large dorsal wedge of bone from the   anterior process of the calcaneus and the cuboid all the way across to the   medial aspect of the talonavicular joint.  We removed more dorsal bone at   plantar and this allowed us to de-rotate the foot, closed it down laterally as   well as closed it down dorsally.  This was a marked improvement in the   alignment of the foot.  We also performed a medial release through our   previously made medial incision, which facilitated the rotation of the foot   with a more balanced weightbearing surface between the fifth and the first   metatarsal heads.  We then drilled the surfaces to be fused with an ACL pin   and packed it with cancellous autograft from the wedge we had removed. It was   held compressed and in proper alignment as we drilled in guide pins for three   5 mm headless compression screws.  Once felt in proper alignment and not   interfering with the previously placed calcaneal screw, we drilled for and   placed each of these screws, which had excellent fixation.  I did  not feel   that an additional staple or plate was necessary.  At this point, the foot   balance was markedly improved.  The ankle was reduced in a much more anatomic   way.  I did still feel that he was overloading his first through fifth   metatarsal heads and we elected to proceed with a dorsiflexion osteotomy of   the first ray.  The previously made incision over the third MTP hardware   removal was extended proximally an additional centimeter. Under direct   radiographic guidance, we used a small oscillating saw to remove a dorsal   wedge of bone.  We then closed it down dorsally and secured it with a 15-mm   staple.  This improved the balance of the foot, but the lesser metatarsal   heads remained prominent.  We used direct radiographic guidance to leann out   the metatarsal necks.  We made a small nick incision over the dorsal medial   aspect of the second, third and fourth with blunt dissection, carried down to   bone.  Under direct radiographic guidance, we used a 2 mm minimally invasive   rmay to make a distal metatarsal osteotomies in plantar proximal to dorsal   distal fashion.  This allowed the metatarsal head to shorten and elevate and   the foot became very nicely balanced.  The fifth we approached through a   dorsal lateral approach, again radiographically selected, a nick incision was   made over the dorsal lateral aspect of the fifth metatarsal neck.  We   dissected anteriorly to allow the ramy to remove a large bunionette   osteophyte, which we did under direct radiographic guidance.  We then   performed a fifth distal metatarsal osteotomy in [place] fashion from this   lateral approach using the minimally invasive ramy.  The foot shape was   excellent at this point and we were satisfied. We proceeded with fixation of   the lateral ligaments.  We used a rongeur to remove small osteophytes from the   distal fibula after excising the ATFL and CFL. A TwinFix anchor was placed 1   cm from the tip of the  fibula and we placed one limb of suture through the   ATFL and one limb through the CFL in modified Aryan-Shimon fashion.  CFL   sutures tied in eversion, ATFL sutures in relaxed plantar flexion and   posterior drawer.  These were then brought back through the fibular periosteum   and again tied.  The tourniquet was let down and hemostasis was achieved.    Each incision was copiously irrigated.  Deep layers were closed with 0 Vicryl   suture in figure-of-eight fashion, subcutaneous layers with 3-0 Monocryl in   buried interrupted fashion, skin closed with 3-0 nylon in horizontal mattress   fashion.  Adaptic gauze and a well-padded posterior splint with stirrup were   placed.  He was awakened and extubated in stable condition.     POSTOPERATIVE COURSE:  He will discharge home today assuming adequate pain   control, mobilization, nonweightbearing right lower extremity.  Aspirin 81 mg   twice daily for DVT prophylaxis.  I will see him in 10-14 days for suture   removal and wound check.  We will transition him to a short leg cast and he   will remain nonweightbearing until healing, likely 8-10 weeks.        ______________________________  MD VIN PORTER/NIDIA/CRISTIAN    DD:  11/18/2021 15:42  DT:  11/18/2021 17:12    Job#:  [id number]

## 2022-03-14 ENCOUNTER — HOSPITAL ENCOUNTER (OUTPATIENT)
Dept: RADIOLOGY | Facility: MEDICAL CENTER | Age: 47
End: 2022-03-14
Attending: ORTHOPAEDIC SURGERY
Payer: MEDICARE

## 2022-03-14 DIAGNOSIS — Q66.89 LEFT CLUB FOOT: ICD-10-CM

## 2022-03-14 PROCEDURE — 73700 CT LOWER EXTREMITY W/O DYE: CPT | Mod: LT,ME

## 2022-03-14 PROCEDURE — 73700 CT LOWER EXTREMITY W/O DYE: CPT | Mod: LT,MF

## 2022-03-30 NOTE — H&P (VIEW-ONLY)
Subjective   Patient ID:  Irene Figueroa is a 46 y.o. male.    Chief Complaint:  Results of the Left Foot and Results of the Left Ankle    Last Surgery: Arthroscopy, Ankle - Right, Osteotomy-tibia/talus Exicison, Calcaneal Slide, Talonavicular, Calcanealcuboid, Distal Metatarsal Osteotomies, Fifth Metatarsal Osteomtomy For Bunionette - Right, Bone Graft- And Allograft - Right, Lengthening, Tendon-tendoachilles Lenghtening, Peroneal And Brevis Longus Repairs - Right, Removal, Hardware-subtalar/talonavicular/calcanealcuboid,great Metatarsalphalangeal Joints - Right, and Reconstruction, Ligament Calcaneofibular - Right on 11/9/2021    BRAYAN Jerome is here for test results visit for the left.  He would like to have a similar procedure done on the left that he had on the right.  He does also want to have his staple removed from the right foot and have the osteophytes removed around his ankle.  In conversation with him it appears that he wants to do this first.    Review of Systems    Objective   Ortho Exam  Alert and oriented no apparent distress.  His right foot alignment really is quite good.  He does get blocked dorsiflexion around neutral.  He is neurovascularly intact.  On the left side he has severe equina cavovarus deformity with hammering of his lesser toes.    Last Imaging Result(s):   CT-FOOT W/O PLUS RECONS LEFT  Narrative: 3/14/2022 8:26 AM    HISTORY/REASON FOR EXAM:  Chronic foot pain. Prior surgery..    TECHNIQUE/ EXAM DESCRIPTION:  CT scan of the LEFT ankle/foot without contrast, with reconstructions.    Thin-section helical noncontrast images were obtained from the distal tibia/fibula through the forefoot. Sagittal and coronal reconstructions were generated from the axial images.    Up to date radiation dose reduction adjustments have been utilized to meet ALARA standards for radiation dose reduction.    COMPARISON:  None.    FINDINGS:  There is osteoarthritis involving the tibiotalar joint  characterized by joint space loss, subchondral sclerosis and subchondral cystic change with osteophytic spurring. There is old posttraumatic and surgical change of the lateral malleolus. There is   subtalar, talonavicular and talocalcaneal arthrodesis using metallic plates and screws with bone continuity across those joints. There is osteoarthritis involving the naviculocuneiform articulation. The anterior and inferior most talonavicular screw does   extend into the navicular cuneiform joint space.  There is osteoarthritis involving the naviculocuneiform articulation characterized by joint space loss and subchondral sclerosis with osteophytic spurring. There is mild osteoarthritis involving the navicular cuneiform articulations characterized by   osteophytic spurring and mild joint space loss.  There is arthrodesis involving the first MTP joint characterized by plate and screw fixation with bone continuity across the joint space. There is also arthrodesis involving the fifth MTP joint with a single metallic screw extending across the joint   space. There is some lucency seen surrounding the screw and there is incomplete bony continuity across the joint space. There is likely some bone bridging dorsally and medially. There is osteoarthritis involving the second through fourth MTP joints   characterized by joint space loss and osteophytic spurring. There is also osteoarthritis involving the first interphalangeal joint.  Impression: 1.  Arthrodesis involving the subtalar, talonavicular, calcaneocuboid, and first MTP joints using metallic hardware. There is bone bridging seen across this joint space.    2.  Arthrodesis involving the fifth MTP joint using a single metallic screw. There is again seen lucency surrounding the screw with incomplete bony bridging.    3.  Osteoarthritis involving the tibiotalar, navicular cuneiform, tarsometatarsal, second through fourth MTP and first IP joints.  CT-ANKLE W/O PLUS RECONS  LEFT  Narrative: 3/14/2022 8:27 AM    HISTORY/REASON FOR EXAM:  Ankle pain. Prior surgery..    TECHNIQUE/ EXAM DESCRIPTION:  CT scan of the LEFT ankle/foot without contrast, with reconstructions.    Thin-section helical noncontrast images were obtained from the distal tibia/fibula through the forefoot. Sagittal and coronal reconstructions were generated from the axial images.    Up to date radiation dose reduction adjustments have been utilized to meet ALARA standards for radiation dose reduction.    COMPARISON:  None.    FINDINGS:  There is osteoarthritis involving the tibiotalar joint characterized by subchondral sclerosis, subchondral cystic change and joint space loss. There is likely old posttraumatic and surgical change of the lateral malleolus. There is subtalar fusion   accomplished by 2 large calcaneonavicular screws posteriorly. There is bony bridging across the subtalar joint. There is also medial plate and screw fixation which bridges the talonavicular joint where there is also seen bony bridging. There is also a   lateral plate and screw fixation with bony bridging stenting across the calcaneocuboid articulation. The more inferior anterior talonavicular screw does extend into the navicular cuneiform joint space. There is osteoarthritis involving the   naviculocuneiform articulations characterized by joint space loss with subchondral cystic change.  There is mild degenerative change of the tarsometatarsal articulations characterized by osteophytic spurring and joint space loss.  Impression: 1.  Arthrodesis involving the tibiotalar, subtalar, talonavicular and calcaneocuboid articulations. This is accomplished using metallic hardware with bony continuity across those joint spaces.    2.  Likely old posttraumatic and surgical changes of the lateral malleolus.    3.  Moderate to severe osteoarthritis involving the tibiotalar and navicular cuneiform articulations.    4.  Mild osteoarthritis involving the  tarsometatarsal articulations.      Assessment & Plan   Encounter Diagnoses:   Chronic pain of right ankle    Left club foot    Orders Placed This Encounter   • Surgical Case Request: REMOVAL, HARDWARE   • DX-ANKLE 3+ VIEWS LEFT   • DX-FOOT-2- LEFT   • DX-OS CALCIS (HEEL) 1V LIMITED LEFT     Justus is a pleasant 46-year-old gentleman with bilateral clubfoot.  He would like to have the right side addressed prior to the left.  He is indicated for removal of hardware from his first ray, a staple.  He is indicated for an arthroscopy of the right ankle with excision of tibial and talar osteophytes.  Once he recovers from this then we will discuss a major reconstructive surgery on the left very similar to the right.  We will need to add on hammertoe corrections on that side.  I think we can discuss this at his first postoperative visit from the right.  I filled out a scheduling form look for to see him on a scheduled date.  Return for Post-Op, Imaging.

## 2022-04-04 PROBLEM — M25.571 ANKLE PAIN, RIGHT: Status: ACTIVE | Noted: 2022-04-04

## 2022-04-06 ENCOUNTER — PRE-ADMISSION TESTING (OUTPATIENT)
Dept: ADMISSIONS | Facility: MEDICAL CENTER | Age: 47
End: 2022-04-06
Attending: ORTHOPAEDIC SURGERY
Payer: MEDICARE

## 2022-04-06 DIAGNOSIS — Z01.812 PRE-OPERATIVE LABORATORY EXAMINATION: ICD-10-CM

## 2022-04-06 LAB
SARS-COV-2 RNA RESP QL NAA+PROBE: NOTDETECTED
SPECIMEN SOURCE: NORMAL

## 2022-04-06 PROCEDURE — U0003 INFECTIOUS AGENT DETECTION BY NUCLEIC ACID (DNA OR RNA); SEVERE ACUTE RESPIRATORY SYNDROME CORONAVIRUS 2 (SARS-COV-2) (CORONAVIRUS DISEASE [COVID-19]), AMPLIFIED PROBE TECHNIQUE, MAKING USE OF HIGH THROUGHPUT TECHNOLOGIES AS DESCRIBED BY CMS-2020-01-R: HCPCS

## 2022-04-06 PROCEDURE — C9803 HOPD COVID-19 SPEC COLLECT: HCPCS

## 2022-04-06 PROCEDURE — U0005 INFEC AGEN DETEC AMPLI PROBE: HCPCS

## 2022-04-12 ENCOUNTER — ANESTHESIA EVENT (OUTPATIENT)
Dept: SURGERY | Facility: MEDICAL CENTER | Age: 47
End: 2022-04-12
Payer: MEDICARE

## 2022-04-12 ENCOUNTER — APPOINTMENT (OUTPATIENT)
Dept: RADIOLOGY | Facility: MEDICAL CENTER | Age: 47
End: 2022-04-12
Attending: ORTHOPAEDIC SURGERY
Payer: MEDICARE

## 2022-04-12 ENCOUNTER — HOSPITAL ENCOUNTER (OUTPATIENT)
Facility: MEDICAL CENTER | Age: 47
Setting detail: OUTPATIENT SURGERY
End: 2022-04-12
Attending: ORTHOPAEDIC SURGERY | Admitting: ORTHOPAEDIC SURGERY
Payer: MEDICARE

## 2022-04-12 ENCOUNTER — ANESTHESIA (OUTPATIENT)
Dept: SURGERY | Facility: MEDICAL CENTER | Age: 47
End: 2022-04-12
Payer: MEDICARE

## 2022-04-12 VITALS
DIASTOLIC BLOOD PRESSURE: 84 MMHG | SYSTOLIC BLOOD PRESSURE: 135 MMHG | OXYGEN SATURATION: 97 % | HEART RATE: 71 BPM | RESPIRATION RATE: 18 BRPM | WEIGHT: 210.1 LBS | HEIGHT: 70 IN | BODY MASS INDEX: 30.08 KG/M2 | TEMPERATURE: 97.1 F

## 2022-04-12 PROCEDURE — 700105 HCHG RX REV CODE 258: Performed by: ORTHOPAEDIC SURGERY

## 2022-04-12 PROCEDURE — 160025 RECOVERY II MINUTES (STATS): Performed by: ORTHOPAEDIC SURGERY

## 2022-04-12 PROCEDURE — 700101 HCHG RX REV CODE 250: Performed by: ORTHOPAEDIC SURGERY

## 2022-04-12 PROCEDURE — 700111 HCHG RX REV CODE 636 W/ 250 OVERRIDE (IP): Performed by: ANESTHESIOLOGY

## 2022-04-12 PROCEDURE — 160041 HCHG SURGERY MINUTES - EA ADDL 1 MIN LEVEL 4: Performed by: ORTHOPAEDIC SURGERY

## 2022-04-12 PROCEDURE — 160009 HCHG ANES TIME/MIN: Performed by: ORTHOPAEDIC SURGERY

## 2022-04-12 PROCEDURE — A9270 NON-COVERED ITEM OR SERVICE: HCPCS | Performed by: ANESTHESIOLOGY

## 2022-04-12 PROCEDURE — 20680 REMOVAL OF IMPLANT DEEP: CPT | Mod: 59,RT | Performed by: ORTHOPAEDIC SURGERY

## 2022-04-12 PROCEDURE — 160002 HCHG RECOVERY MINUTES (STAT): Performed by: ORTHOPAEDIC SURGERY

## 2022-04-12 PROCEDURE — 700102 HCHG RX REV CODE 250 W/ 637 OVERRIDE(OP): Performed by: ANESTHESIOLOGY

## 2022-04-12 PROCEDURE — 8968 PR NO CHARGE - PROCEDURE: Mod: ASROC | Performed by: NURSE PRACTITIONER

## 2022-04-12 PROCEDURE — 160035 HCHG PACU - 1ST 60 MINS PHASE I: Performed by: ORTHOPAEDIC SURGERY

## 2022-04-12 PROCEDURE — A6222 GAUZE <=16 IN NO W/SAL W/O B: HCPCS | Performed by: ORTHOPAEDIC SURGERY

## 2022-04-12 PROCEDURE — 29898 ANKLE ARTHROSCOPY/SURGERY: CPT | Mod: ASROC | Performed by: NURSE PRACTITIONER

## 2022-04-12 PROCEDURE — 501838 HCHG SUTURE GENERAL: Performed by: ORTHOPAEDIC SURGERY

## 2022-04-12 PROCEDURE — A6223 GAUZE >16<=48 NO W/SAL W/O B: HCPCS | Performed by: ORTHOPAEDIC SURGERY

## 2022-04-12 PROCEDURE — 160046 HCHG PACU - 1ST 60 MINS PHASE II: Performed by: ORTHOPAEDIC SURGERY

## 2022-04-12 PROCEDURE — 160029 HCHG SURGERY MINUTES - 1ST 30 MINS LEVEL 4: Performed by: ORTHOPAEDIC SURGERY

## 2022-04-12 PROCEDURE — 160048 HCHG OR STATISTICAL LEVEL 1-5: Performed by: ORTHOPAEDIC SURGERY

## 2022-04-12 PROCEDURE — 27640 PARTIAL REMOVAL OF TIBIA: CPT | Performed by: ORTHOPAEDIC SURGERY

## 2022-04-12 PROCEDURE — 28120 PART REMOVAL OF ANKLE/HEEL: CPT | Performed by: ORTHOPAEDIC SURGERY

## 2022-04-12 PROCEDURE — 700111 HCHG RX REV CODE 636 W/ 250 OVERRIDE (IP): Performed by: ORTHOPAEDIC SURGERY

## 2022-04-12 PROCEDURE — 700101 HCHG RX REV CODE 250: Performed by: ANESTHESIOLOGY

## 2022-04-12 PROCEDURE — 160036 HCHG PACU - EA ADDL 30 MINS PHASE I: Performed by: ORTHOPAEDIC SURGERY

## 2022-04-12 PROCEDURE — 29898 ANKLE ARTHROSCOPY/SURGERY: CPT | Performed by: ORTHOPAEDIC SURGERY

## 2022-04-12 PROCEDURE — 20680 REMOVAL OF IMPLANT DEEP: CPT | Mod: ASROC,59,RT | Performed by: NURSE PRACTITIONER

## 2022-04-12 PROCEDURE — 01392 ANES OPN PX UPR TIB FIB&/PAT: CPT | Performed by: ANESTHESIOLOGY

## 2022-04-12 RX ORDER — LIDOCAINE HYDROCHLORIDE 20 MG/ML
INJECTION, SOLUTION EPIDURAL; INFILTRATION; INTRACAUDAL; PERINEURAL PRN
Status: DISCONTINUED | OUTPATIENT
Start: 2022-04-12 | End: 2022-04-12 | Stop reason: SURG

## 2022-04-12 RX ORDER — METOPROLOL TARTRATE 1 MG/ML
1 INJECTION, SOLUTION INTRAVENOUS
Status: DISCONTINUED | OUTPATIENT
Start: 2022-04-12 | End: 2022-04-12 | Stop reason: HOSPADM

## 2022-04-12 RX ORDER — HYDROMORPHONE HYDROCHLORIDE 1 MG/ML
0.1 INJECTION, SOLUTION INTRAMUSCULAR; INTRAVENOUS; SUBCUTANEOUS
Status: DISCONTINUED | OUTPATIENT
Start: 2022-04-12 | End: 2022-04-12 | Stop reason: HOSPADM

## 2022-04-12 RX ORDER — CEFAZOLIN SODIUM 1 G/3ML
2 INJECTION, POWDER, FOR SOLUTION INTRAMUSCULAR; INTRAVENOUS ONCE
Status: COMPLETED | OUTPATIENT
Start: 2022-04-12 | End: 2022-04-12

## 2022-04-12 RX ORDER — HYDROMORPHONE HYDROCHLORIDE 1 MG/ML
0.2 INJECTION, SOLUTION INTRAMUSCULAR; INTRAVENOUS; SUBCUTANEOUS
Status: DISCONTINUED | OUTPATIENT
Start: 2022-04-12 | End: 2022-04-12 | Stop reason: HOSPADM

## 2022-04-12 RX ORDER — OXYCODONE HCL 5 MG/5 ML
10 SOLUTION, ORAL ORAL
Status: COMPLETED | OUTPATIENT
Start: 2022-04-12 | End: 2022-04-12

## 2022-04-12 RX ORDER — HALOPERIDOL 5 MG/ML
1 INJECTION INTRAMUSCULAR
Status: DISCONTINUED | OUTPATIENT
Start: 2022-04-12 | End: 2022-04-12 | Stop reason: HOSPADM

## 2022-04-12 RX ORDER — OXYCODONE HCL 5 MG/5 ML
5 SOLUTION, ORAL ORAL
Status: COMPLETED | OUTPATIENT
Start: 2022-04-12 | End: 2022-04-12

## 2022-04-12 RX ORDER — BUPIVACAINE HYDROCHLORIDE 5 MG/ML
INJECTION, SOLUTION EPIDURAL; INTRACAUDAL
Status: DISCONTINUED | OUTPATIENT
Start: 2022-04-12 | End: 2022-04-12 | Stop reason: HOSPADM

## 2022-04-12 RX ORDER — HYDRALAZINE HYDROCHLORIDE 20 MG/ML
5 INJECTION INTRAMUSCULAR; INTRAVENOUS
Status: DISCONTINUED | OUTPATIENT
Start: 2022-04-12 | End: 2022-04-12 | Stop reason: HOSPADM

## 2022-04-12 RX ORDER — KETOROLAC TROMETHAMINE 30 MG/ML
INJECTION, SOLUTION INTRAMUSCULAR; INTRAVENOUS PRN
Status: DISCONTINUED | OUTPATIENT
Start: 2022-04-12 | End: 2022-04-12 | Stop reason: SURG

## 2022-04-12 RX ORDER — HYDROMORPHONE HYDROCHLORIDE 1 MG/ML
0.4 INJECTION, SOLUTION INTRAMUSCULAR; INTRAVENOUS; SUBCUTANEOUS
Status: DISCONTINUED | OUTPATIENT
Start: 2022-04-12 | End: 2022-04-12 | Stop reason: HOSPADM

## 2022-04-12 RX ORDER — SODIUM CHLORIDE, SODIUM LACTATE, POTASSIUM CHLORIDE, CALCIUM CHLORIDE 600; 310; 30; 20 MG/100ML; MG/100ML; MG/100ML; MG/100ML
INJECTION, SOLUTION INTRAVENOUS CONTINUOUS
Status: ACTIVE | OUTPATIENT
Start: 2022-04-12 | End: 2022-04-12

## 2022-04-12 RX ORDER — MIDAZOLAM HYDROCHLORIDE 1 MG/ML
1 INJECTION INTRAMUSCULAR; INTRAVENOUS
Status: DISCONTINUED | OUTPATIENT
Start: 2022-04-12 | End: 2022-04-12 | Stop reason: HOSPADM

## 2022-04-12 RX ORDER — MEPERIDINE HYDROCHLORIDE 25 MG/ML
12.5 INJECTION INTRAMUSCULAR; INTRAVENOUS; SUBCUTANEOUS
Status: DISCONTINUED | OUTPATIENT
Start: 2022-04-12 | End: 2022-04-12 | Stop reason: HOSPADM

## 2022-04-12 RX ORDER — SODIUM CHLORIDE, SODIUM LACTATE, POTASSIUM CHLORIDE, CALCIUM CHLORIDE 600; 310; 30; 20 MG/100ML; MG/100ML; MG/100ML; MG/100ML
INJECTION, SOLUTION INTRAVENOUS CONTINUOUS
Status: DISCONTINUED | OUTPATIENT
Start: 2022-04-12 | End: 2022-04-12 | Stop reason: HOSPADM

## 2022-04-12 RX ORDER — ONDANSETRON 2 MG/ML
4 INJECTION INTRAMUSCULAR; INTRAVENOUS
Status: COMPLETED | OUTPATIENT
Start: 2022-04-12 | End: 2022-04-12

## 2022-04-12 RX ORDER — DIPHENHYDRAMINE HYDROCHLORIDE 50 MG/ML
12.5 INJECTION INTRAMUSCULAR; INTRAVENOUS
Status: DISCONTINUED | OUTPATIENT
Start: 2022-04-12 | End: 2022-04-12 | Stop reason: HOSPADM

## 2022-04-12 RX ORDER — IPRATROPIUM BROMIDE AND ALBUTEROL SULFATE 2.5; .5 MG/3ML; MG/3ML
3 SOLUTION RESPIRATORY (INHALATION)
Status: DISCONTINUED | OUTPATIENT
Start: 2022-04-12 | End: 2022-04-12 | Stop reason: HOSPADM

## 2022-04-12 RX ADMIN — ONDANSETRON 4 MG: 2 INJECTION INTRAMUSCULAR; INTRAVENOUS at 09:28

## 2022-04-12 RX ADMIN — PROPOFOL 200 MG: 10 INJECTION, EMULSION INTRAVENOUS at 08:16

## 2022-04-12 RX ADMIN — SODIUM CHLORIDE, POTASSIUM CHLORIDE, SODIUM LACTATE AND CALCIUM CHLORIDE: 600; 310; 30; 20 INJECTION, SOLUTION INTRAVENOUS at 07:21

## 2022-04-12 RX ADMIN — CEFAZOLIN 2 G: 330 INJECTION, POWDER, FOR SOLUTION INTRAMUSCULAR; INTRAVENOUS at 08:16

## 2022-04-12 RX ADMIN — FENTANYL CITRATE 50 MCG: 50 INJECTION, SOLUTION INTRAMUSCULAR; INTRAVENOUS at 09:34

## 2022-04-12 RX ADMIN — LIDOCAINE HYDROCHLORIDE 100 MG: 20 INJECTION, SOLUTION EPIDURAL; INFILTRATION; INTRACAUDAL at 08:16

## 2022-04-12 RX ADMIN — OXYCODONE HYDROCHLORIDE 10 MG: 5 SOLUTION ORAL at 09:28

## 2022-04-12 RX ADMIN — FENTANYL CITRATE 100 MCG: 50 INJECTION, SOLUTION INTRAMUSCULAR; INTRAVENOUS at 08:26

## 2022-04-12 RX ADMIN — LIDOCAINE HYDROCHLORIDE 0.5 ML: 10 INJECTION, SOLUTION EPIDURAL; INFILTRATION; INTRACAUDAL at 07:21

## 2022-04-12 RX ADMIN — KETOROLAC TROMETHAMINE 30 MG: 30 INJECTION, SOLUTION INTRAMUSCULAR at 09:17

## 2022-04-12 RX ADMIN — HYDROMORPHONE HYDROCHLORIDE 0.4 MG: 1 INJECTION, SOLUTION INTRAMUSCULAR; INTRAVENOUS; SUBCUTANEOUS at 10:24

## 2022-04-12 RX ADMIN — FENTANYL CITRATE 100 MCG: 50 INJECTION, SOLUTION INTRAMUSCULAR; INTRAVENOUS at 08:16

## 2022-04-12 RX ADMIN — FENTANYL CITRATE 50 MCG: 50 INJECTION, SOLUTION INTRAMUSCULAR; INTRAVENOUS at 09:46

## 2022-04-12 RX ADMIN — HYDROMORPHONE HYDROCHLORIDE 0.4 MG: 1 INJECTION, SOLUTION INTRAMUSCULAR; INTRAVENOUS; SUBCUTANEOUS at 10:08

## 2022-04-12 ASSESSMENT — PAIN DESCRIPTION - PAIN TYPE
TYPE: SURGICAL PAIN

## 2022-04-12 NOTE — ANESTHESIA PROCEDURE NOTES
Airway    Date/Time: 4/12/2022 8:17 AM  Performed by: Jean Tan M.D.  Authorized by: Jean Tan M.D.     Location:  OR  Urgency:  Elective  Difficult Airway: No    Indications for Airway Management:  Anesthesia      Spontaneous Ventilation: absent    Sedation Level:  Deep  Preoxygenated: Yes    Mask Difficulty Assessment:  1 - vent by mask  Final Airway Type:  Supraglottic airway  Final Supraglottic Airway:  Standard LMA    SGA Size:  4  Number of Attempts at Approach:  1

## 2022-04-12 NOTE — LETTER
April 4, 2022    Patient Name: Irene Figueroa  Surgeon Name: Mario Antoine M.D.  Surgery Facility: [facility] ([address] [facility])  Surgery Date: 4/12/2022    The time of your surgery is not final and may change up to and until the day of your surgery. You will be contacted 24-48 hours prior to your surgery date with your check-in and surgery time.    If you will not be at one of the below numbers please call/text the surgery scheduler at [phone]  Preferred Phone: [phone]    BEFORE YOUR SURGERY  Pre Registration and/or Lab Work must be done within and no earlier than 28 days prior to your surgery date. Please call [facility] at [phone] for an appointment as soon as possible.     COVID test required 4-7 days prior to surgery, failure to do so can result in a cancellation.    The following locations offer COVID testing:    Approved facilities for COVID testing, if scheduled at [facility]:  · [facility] from 7:30am-3:30pm at [address] [place], [place]  · [facility] [phone] (Please call for an appointment)  · Your local pharmacy    Not scheduled at [facility] contact the scheduled facility for approved testing facilities.    Pre op Appointment:  Instructions: Bring a list of all medications you are taking including the dosing and frequency.    Please refrain from smoking any substance after midnight prior to surgery. Smoking may interfere with the anesthetic and frequently produces nausea during the recovery period.    Continue taking all lifesaving medications. Including the morning of your surgery with small sip of water.    Please read the MEDICATION INSTRUCTIONS below completely.    DAY OF YOUR SURGERY  Nothing to eat or drink after midnight     Please arrive at the hospital/surgery center at the check-in time provided.     An adult will need to bring you and take you home after your surgery.     AFTER YOUR SURGERY  Post op  Appointment:   Date: 04/25/22   Time: 10:00AM   With: Mario Antoine M.D.   Location: [address] [place] [address]    TIME OFF WORK  FMLA or Disability forms can be faxed directly to: [phone] or you may drop them off at [address] [place] [facility] [address]. Our office charges a $35.00 fee per form. Forms will be completed within 10 business days of drop off and payment received. For the status of your forms you may contact our disability office directly [phone].    MEDICATION INSTRUCTIONS  The following medications should be stopped a minimum of 10 days prior to surgery:  All over the counter, Supplements & Herbal medications    Anorectics: Phentermine (Adipex-P, Lomaira and Suprenza), Phentermine-topiramate (Qsymia), Bupropion-naltrexone (Contrave)    Opiod Partial Agonists/Opioid Antagonists: Buprenorphine (Subocone, Belbuca, Butrans, Probuphine Implant, Sublocade), Naltrexone (ReVia, Vivitrol), Naloxone    Amphetamines: Dextroamphetamine/Amphetamine (Adderall, Mydayis), Methylphenidate Hydrochloride (Concerta, Metadate, Methylin, Ritalin)    The following medications should be stopped 5 days prior to surgery:  Blood Thinners: Any Aspirin, Aspirin products, anti-inflammatories such as ibuprofen and any blood thinners such as Coumadin and Plavix. Please consult your prescribing physician if you are on life saving blood thinners, in regards to when to stop medications prior to surgery.     The following medications should be stopped a minimum of 3 days prior to surgery:  PDE-5 inhibitors: Sildenafil (Viagra), Tadalafil (Cialis), Vardenafil (Levitra), Avanafil (Stendra)    MAO Inhibitors: Rasagiline (Azilect), Selegiline (Eldepryl, Emsam, Selapar), Isocarboxazid (Marplan), Phenelzine (Nardil)

## 2022-04-12 NOTE — ANESTHESIA POSTPROCEDURE EVALUATION
Patient: Irene Figueroa    Procedure Summary     Date: 04/12/22 Room / Location: [facility] 06 / SURGERY [facility]    Anesthesia Start: 0814 Anesthesia Stop: 0922    Procedures:       RIGHT ANKLE ARTHROSCOPY, EXCISION TIBIA/EXCISION TALUS OSTEOPHYTES, REMOVAL OF HARDWARE (Right Ankle)      ARTHROSCOPY, ANKLE (Right Ankle) Diagnosis:       Ankle pain, right      (Ankle pain, right )    Surgeons: Mario Antoine M.D. Responsible Provider: Jean Tan M.D.    Anesthesia Type: general ASA Status: 1          Final Anesthesia Type: general  Last vitals  BP   Blood Pressure: 111/70    Temp   37 °C (98.6 °F)    Pulse   63   Resp   14    SpO2   96 %      Anesthesia Post Evaluation    Patient location during evaluation: PACU  Patient participation: complete - patient participated  Level of consciousness: awake and alert    Airway patency: patent  Anesthetic complications: no  Cardiovascular status: hemodynamically stable  Respiratory status: acceptable  Hydration status: euvolemic    PONV: none          No complications documented.     Nurse Pain Score: 0 (NPRS)

## 2022-04-12 NOTE — ANESTHESIA PREPROCEDURE EVALUATION
Case: [id number] Date/Time: 04/12/22 0815    Procedures:       RIGHT ANKLE ARTHROSCOPY, EXCISION TIBIA/EXCISION TALUS OSTEOPHYTES, REMOVAL OF HARDWARE (Right )      ARTHROSCOPY, ANKLE    Diagnosis: Ankle pain, right [M25.571]    Pre-op diagnosis: Ankle pain, right [M25.571]    Location: [place] [address] / SURGERY [facility]    Surgeons: Mario Antoine M.D.          Relevant Problems   Other   (positive) Ankle pain, right   (positive) Clubfoot of right lower extremity       Physical Exam    Airway   Mallampati: II  TM distance: >3 FB  Neck ROM: full       Cardiovascular - normal exam  Rhythm: regular  Rate: normal  (-) murmur     Dental - normal exam           Pulmonary - normal exam  Breath sounds clear to auscultation     Abdominal    Neurological - normal exam                 Anesthesia Plan    ASA 1       Plan - general       Airway plan will be LMA          Induction: intravenous    Postoperative Plan: Postoperative administration of opioids is intended.    Pertinent diagnostic labs and testing reviewed    Informed Consent:    Anesthetic plan and risks discussed with patient.    Use of blood products discussed with: patient whom consented to blood products.

## 2022-04-12 NOTE — ANESTHESIA TIME REPORT
Anesthesia Start and Stop Event Times     Date Time Event    4/12/2022 0804 Ready for Procedure     0814 Anesthesia Start     0922 Anesthesia Stop        Responsible Staff  04/12/22    Name Role Begin End    Jean Tan M.D. Anesth 0814 0922        Overtime Reason:  no overtime (within assigned shift)    Comments:

## 2022-04-12 NOTE — OP REPORT
DATE OF SERVICE:  04/12/2022     PREOPERATIVE DIAGNOSES:  1.  Right clubfoot.  2.  Right ankle pain.  3.  Right tibial exostosis.  4.  Right talus exostosis.  5.  Right painful hardware foot.     POSTOPERATIVE DIAGNOSES:  1.  Right clubfoot.  2.  Right ankle pain.  3.  Right tibial exostosis.  4.  Right talus exostosis.  5.  Right painful hardware foot.     PROCEDURES:  1.  Right ankle arthroscopy with extensive debridement.  2.  Right partial excision, tibia.  3.  Right partial excision, talus.  4.  Right hardware removal, first metatarsal.     SURGEON:  Mario Antoine MD     ASSISTANT:  EUGENIO Galeas     ANESTHESIA:  General with 30 mL local 0.5% Marcaine without epinephrine.     ESTIMATED BLOOD LOSS:  10 mL.     TOURNIQUET TIME:  41 minutes at 250 mmHg.     COMPLICATIONS:  None.     OUTCOME:  PACU in stable condition.     HISTORY OF PRESENT ILLNESS:  This 46-year-old gentleman underwent revision   clubfoot reconstruction and has overall done well.  He was noncompliant with   his weightbearing status postoperatively and displaced a staple in his first   metatarsal.  Fortunately, he has gone on to heal, but the staple is painful.    He has also had increase in scar tissue in the anterior aspect of the joint   with tibial and talar exostosis.  He was indicated for the above-stated   procedure, greeted in the preoperative holding area and identified by name and   medical record number.  Right lower extremity was marked.  Risks of procedure   including bleeding, infection, pain, continuation of symptoms, neurovascular   damage, need for more surgery and he provided written consent.     DESCRIPTION OF PROCEDURE:  He was taken to the operating room and placed on   table in supine position.  Preoperative antibiotics were administered.    General anesthesia was induced.  Nonsterile tourniquet was placed on his right   thigh. Right lower extremity prepped and draped in the usual sterile fashion.    Operative  pause was taken, all present were in agreement with patient   identification, laterality, and procedure to be performed.  The limb was   elevated for 5 minutes, tourniquet raised to 250 mmHg. We made a 2 cm   longitudinal incision over his dorsal first metatarsal.  Blunt dissection   carried down to the staple which was able to be removed with a Cabral elevator.    This incision copiously irrigated and closed with 3-0 nylon in horizontal   mattress fashion.     We insufflated the joint with 10 mL sterile normal saline.  A medial portal   was made at the level of joint just medial to the tibialis anterior tendon.    We used a Cabral elevator to clear capsular scar from the tibia and the talus.    Lateral portal was established followed by a 4 mm shaver.  We used a 15 blade   to cut through thickened scar at the level of the joint.  We used the shaver   to meticulously remove the scar from the anterior joint, the distal tibia and   the dorsal talar neck.  We spent a significant amount of time on this   periodically.  We used a 4 mm bur to remove significant amount of bony   exostosis from the anterior distal tibia and the dorsal talar neck.  We   significantly improved the range of motion of his ankle by removing so much   scar and bone.  We did note some full-thickness cartilage defect in the   anterior aspect of the joint, but the rest of the cartilage looked quite good.    No evidence of interval complication.  Tourniquet was let down.  Hemostasis   was achieved.  Incision was copiously irrigated and closed with 3-0 nylon in   horizontal mattress fashion.  An Adaptic gauze and compressive wrap were   placed.  He was awakened and extubated in stable condition.     POSTOPERATIVE COURSE:  He will discharge home today assuming adequate pain   control, mobilization.  Therapy, I would like to start in 1-2 weeks.  I will   see him in 10-14 days for suture removal and wound check.  If he has not   already weaned out of the  boot at that time, he may do so that.  We will also   discuss a revision clubfoot procedure on the left.        ______________________________  MD VIN PORTER/NIDIA/JUAN    DD:  04/12/2022 09:39  DT:  04/12/2022 10:02    Job#:  [id number]

## 2022-04-12 NOTE — OR SURGEON
Immediate Post OP Note    PreOp Diagnosis: Right clubfoot, painful ankle, painful hardware      PostOp Diagnosis: Same      Procedure(s):  RIGHT ANKLE ARTHROSCOPY, EXCISION TIBIA/EXCISION TALUS OSTEOPHYTES, REMOVAL OF HARDWARE - Wound Class: Clean  ARTHROSCOPY, ANKLE - Wound Class: Clean    Surgeon(s):  Mario Antoine M.D.    Anesthesiologist/Type of Anesthesia:  Anesthesiologist: Jean Tan M.D./General    Surgical Staff:  Assistant: BALTA Chakraborty  Circulator: Marta Menon RPHI; Jazmín Mtz RPHI  Relief Circulator: Pamela Stevens R.N.  Scrub Person: Fernando Blood    Specimens removed if any:  * No specimens in log *    Estimated Blood Loss: 10cc    Findings: Extensive scar    Complications: None        4/12/2022 9:36 AM Mario Antoine M.D.

## 2022-04-12 NOTE — DISCHARGE INSTRUCTIONS
ACTIVITY: Rest and take it easy for the first 24 hours.  A responsible adult is recommended to remain with you during that time.  It is normal to feel sleepy.  We encourage you to not do anything that requires balance, judgment or coordination.    MILD FLU-LIKE SYMPTOMS ARE NORMAL. YOU MAY EXPERIENCE GENERALIZED MUSCLE ACHES, THROAT IRRITATION, HEADACHE AND/OR SOME NAUSEA.    FOR 24 HOURS DO NOT:  Drive, operate machinery or run household appliances.  Drink beer or alcoholic beverages.   Make important decisions or sign legal documents.    SPECIAL INSTRUCTIONS:   Weight bearing as tolerated right lower extremity in boot   Ice and elevate   Stay ahead of pain   Keep dressing clean and dry   Aspirin 81mg two times a day for clot prevention    DIET: To avoid nausea, slowly advance diet as tolerated, avoiding spicy or greasy foods for the first day.  Add more substantial food to your diet according to your physician's instructions.  Babies can be fed formula or breast milk as soon as they are hungry.  INCREASE FLUIDS AND FIBER TO AVOID CONSTIPATION.    FOLLOW-UP APPOINTMENT:  A follow-up appointment should be arranged with your doctor; call to schedule.    You should CALL YOUR PHYSICIAN if you develop:  Fever greater than 101 degrees F.  Pain not relieved by medication, or persistent nausea or vomiting.  Excessive bleeding (blood soaking through dressing) or unexpected drainage from the wound.  Extreme redness or swelling around the incision site, drainage of pus or foul smelling drainage.  Inability to urinate or empty your bladder within 8 hours.  Problems with breathing or chest pain.    You should call 911 if you develop problems with breathing or chest pain.  If you are unable to contact your doctor or surgical center, you should go to the nearest emergency room or urgent care center.  Physician's telephone #: [phone]    If any questions arise, call your doctor.  If your doctor is not available, please feel  free to call the Surgical Center at [phone].     A registered nurse may call you a few days after your surgery to see how you are doing after your procedure.    MEDICATIONS: Resume taking daily medication.  Take prescribed pain medication with food.  If no medication is prescribed, you may take non-aspirin pain medication if needed.  PAIN MEDICATION CAN BE VERY CONSTIPATING.  Take a stool softener or laxative such as senokot, pericolace, or milk of magnesia if needed.    Prescription given for oxycodone 5mg.  Last pain medication given at 0928, oxycodone 10mg.    If your physician has prescribed pain medication that includes Acetaminophen (Tylenol), do not take additional Acetaminophen (Tylenol) while taking the prescribed medication.    Depression / Suicide Risk    As you are discharged from this [facility] facility, it is important to learn how to keep safe from harming yourself.    Recognize the warning signs:  · Abrupt changes in personality, positive or negative- including increase in energy   · Giving away possessions  · Change in eating patterns- significant weight changes-  positive or negative  · Change in sleeping patterns- unable to sleep or sleeping all the time   · Unwillingness or inability to communicate  · Depression  · Unusual sadness, discouragement and loneliness  · Talk of wanting to die  · Neglect of personal appearance   · Rebelliousness- reckless behavior  · Withdrawal from people/activities they love  · Confusion- inability to concentrate     If you or a loved one observes any of these behaviors or has concerns about self-harm, here's what you can do:  · Talk about it- your feelings and reasons for harming yourself  · Remove any means that you might use to hurt yourself (examples: pills, rope, extension cords, firearm)  · Get professional help from the community (Mental Health, Substance Abuse, psychological counseling)  · Do not be alone:Call your Safe Contact- someone whom you  trust who will be there for you.  · Call your local CRISIS HOTLINE 748-1788 or 128-655-3196  · Call your local Children's Mobile Crisis Response Team [place] (435) 454-7229 or www.[employer]  · Call the toll free National Suicide Prevention Hotlines   · National Suicide Prevention Lifeline 091-707-NTHI (6572)  · National [employer] Line Network 800-SUICIDE (258-0669)

## 2022-04-12 NOTE — OR NURSING
0920 Pt arrived from OR via [facility]. Report given by anesthesia and RN. 96.8. sleeping, perrla, 6L mask, even non labored breathing, lungs clear airway patent. SR. Skin pink warm dry RLE elevated, cold pack, dressing cdi no drainage, toes pink warm, brisk cap refill. PIV patent.     0928 0934 awake, clear speech, follow commands. C/o pain and nausea, treated per mar     0940 nausea resolved. drinking water. RLE wiggles toes, denies numbness tingling. DENIES sob, chest pain.     0946 c/o pain treated per mar    1000 awake and alert. Pain tolerable. Denies nausea. Even non labored breathing. Skin pink warm dry. RLE dressing cdi, no drainage, cold pack, elevated, toes pink warm brisk cap refill, motion intact.     1004 left message for Hope, mother. O2 tank full for transfer      1008 c/o pain treated per mar    1015 sleeping, even non labored breathing, skin pink warm dry. Face relaxed.     1024 awake, c/o pain treated per mar    1030 awake and alert, smiling and talking. Pain tolerable. DENIES nausea, sob chest pain numbness tingling. RLE no changes.     1033 report given to Sofia ALLEN, phase 2, rm 30. VSS pt ready for transfer to phase 2

## 2022-04-25 NOTE — H&P (VIEW-ONLY)
Subjective   Patient ID:  Irene Figueroa is a 46 y.o. male.    Chief Complaint:  Follow-Up of the Right Ankle    Last Surgery: Right Ankle Arthroscopy, Excision Tibia/excision Talus Osteophytes, Removal Of Hardware - Right and Arthroscopy, Ankle - Right on 4/12/2022    HPI Justus is recovering well from his right ankle arthroscopy and hardware removal however he did get his incision wet has a bit of irritation over his portals.  He would like to talk about surgery on the left.    Review of Systems    Objective   Ortho Exam  Alert and oriented no apparent distress.  Some erythema over his right ankle portals, no drainage.  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  His heel appears in better position than the right was preoperatively.  His lesser toes.    Last Imaging Result(s):   DX-ANKLE 3+ VIEWS RIGHT, DX-FOOT-2- RIGHT  No x-rays taken today shows interval removal of his staple.  We removed   some bone from the anterior aspect of his ankle.  No evidence of interval   complication.  Left side CT and x-rays are available for review shows hammering of his lesser toes.  There is a loose screw in his fifth toe and metatarsal.  Hardware over his calcaneocuboid subtalar and talonavicular joints that appear to be Arthrex.  MTP fusion plate with healing, degenerative change of his great interphalangeal joint.  Marked equina cavovarus.    Assessment & Plan   Encounter Diagnoses:   Chronic pain of right ankle    Clubfoot of right lower extremity    Orders Placed This Encounter   • CASTING - BACK OFFICE   • Suture/Staple Removal   • DX-ANKLE 3+ VIEWS RIGHT   • DX-FOOT-2- RIGHT   • cephALEXin (KEFLEX) 500 MG Cap     Justus is a 46-year-old gentleman with history of bilateral clubfoot.  Regarding the right side I have given him 3 weeks of Keflex to try to get the erythema to clear.  On the left we will remove all of his hardware, perform a tendo Achilles lengthening, derotational osteotomy through the talonavicular  calcaneocuboid joints, dorsiflexion osteotomy first ray, bone spur excision from his midfoot, second through fifth hammertoe corrections.  He will be in a splint postoperatively and he is agreed to be compliant with this.  We can then transition him to a boot.  I do not think he needs a Calc slide osteotomy or an ankle scope in this particular procedure.  We discussed risks including bleeding infection pain malunion nonunion neurovascular damage and need for more surgery he would like to proceed.  I filled out a scheduling form and look forward to seeing him on a scheduled date.  Return for Post-Op, Imaging.

## 2022-04-28 ENCOUNTER — PRE-ADMISSION TESTING (OUTPATIENT)
Dept: ADMISSIONS | Facility: MEDICAL CENTER | Age: 47
End: 2022-04-28
Attending: ORTHOPAEDIC SURGERY
Payer: MEDICARE

## 2022-05-03 ENCOUNTER — HOSPITAL ENCOUNTER (OUTPATIENT)
Facility: MEDICAL CENTER | Age: 47
Setting detail: OUTPATIENT SURGERY
End: 2022-05-03
Attending: ORTHOPAEDIC SURGERY | Admitting: ORTHOPAEDIC SURGERY
Payer: MEDICARE

## 2022-05-03 ENCOUNTER — ANESTHESIA EVENT (OUTPATIENT)
Dept: SURGERY | Facility: MEDICAL CENTER | Age: 47
End: 2022-05-03
Payer: MEDICARE

## 2022-05-03 ENCOUNTER — APPOINTMENT (OUTPATIENT)
Dept: RADIOLOGY | Facility: MEDICAL CENTER | Age: 47
End: 2022-05-03
Attending: ORTHOPAEDIC SURGERY
Payer: MEDICARE

## 2022-05-03 ENCOUNTER — ANESTHESIA (OUTPATIENT)
Dept: SURGERY | Facility: MEDICAL CENTER | Age: 47
End: 2022-05-03
Payer: MEDICARE

## 2022-05-03 VITALS
WEIGHT: 211.86 LBS | OXYGEN SATURATION: 95 % | SYSTOLIC BLOOD PRESSURE: 124 MMHG | DIASTOLIC BLOOD PRESSURE: 83 MMHG | HEIGHT: 69 IN | HEART RATE: 85 BPM | BODY MASS INDEX: 31.38 KG/M2 | TEMPERATURE: 97.1 F | RESPIRATION RATE: 34 BRPM

## 2022-05-03 PROCEDURE — 160046 HCHG PACU - 1ST 60 MINS PHASE II: Performed by: ORTHOPAEDIC SURGERY

## 2022-05-03 PROCEDURE — 500925 HCHG PIN GUARD: Performed by: ORTHOPAEDIC SURGERY

## 2022-05-03 PROCEDURE — 20902 REMOVAL OF BONE FOR GRAFT: CPT | Mod: 79,59 | Performed by: ORTHOPAEDIC SURGERY

## 2022-05-03 PROCEDURE — 160041 HCHG SURGERY MINUTES - EA ADDL 1 MIN LEVEL 4: Performed by: ORTHOPAEDIC SURGERY

## 2022-05-03 PROCEDURE — 160025 RECOVERY II MINUTES (STATS): Performed by: ORTHOPAEDIC SURGERY

## 2022-05-03 PROCEDURE — 28305 INCISE/GRAFT MIDFOOT BONES: CPT | Mod: 79,59 | Performed by: ORTHOPAEDIC SURGERY

## 2022-05-03 PROCEDURE — 28122 PARTIAL REMOVAL OF FOOT BONE: CPT | Mod: ASROC,79,59 | Performed by: NURSE PRACTITIONER

## 2022-05-03 PROCEDURE — 27606 INCISION OF ACHILLES TENDON: CPT | Mod: 79 | Performed by: ORTHOPAEDIC SURGERY

## 2022-05-03 PROCEDURE — 700111 HCHG RX REV CODE 636 W/ 250 OVERRIDE (IP): Performed by: STUDENT IN AN ORGANIZED HEALTH CARE EDUCATION/TRAINING PROGRAM

## 2022-05-03 PROCEDURE — 01480 ANES OPEN PX LOWER L/A/F NOS: CPT | Performed by: STUDENT IN AN ORGANIZED HEALTH CARE EDUCATION/TRAINING PROGRAM

## 2022-05-03 PROCEDURE — 160009 HCHG ANES TIME/MIN: Performed by: ORTHOPAEDIC SURGERY

## 2022-05-03 PROCEDURE — 700101 HCHG RX REV CODE 250: Performed by: STUDENT IN AN ORGANIZED HEALTH CARE EDUCATION/TRAINING PROGRAM

## 2022-05-03 PROCEDURE — 20902 REMOVAL OF BONE FOR GRAFT: CPT | Mod: ASROC,79,59 | Performed by: NURSE PRACTITIONER

## 2022-05-03 PROCEDURE — 20680 REMOVAL OF IMPLANT DEEP: CPT | Mod: 79,LT | Performed by: ORTHOPAEDIC SURGERY

## 2022-05-03 PROCEDURE — 160002 HCHG RECOVERY MINUTES (STAT): Performed by: ORTHOPAEDIC SURGERY

## 2022-05-03 PROCEDURE — 160035 HCHG PACU - 1ST 60 MINS PHASE I: Performed by: ORTHOPAEDIC SURGERY

## 2022-05-03 PROCEDURE — 502000 HCHG MISC OR IMPLANTS RC 0278: Performed by: ORTHOPAEDIC SURGERY

## 2022-05-03 PROCEDURE — 700105 HCHG RX REV CODE 258: Performed by: ORTHOPAEDIC SURGERY

## 2022-05-03 PROCEDURE — 502240 HCHG MISC OR SUPPLY RC 0272: Performed by: ORTHOPAEDIC SURGERY

## 2022-05-03 PROCEDURE — 73630 X-RAY EXAM OF FOOT: CPT | Mod: LT

## 2022-05-03 PROCEDURE — 28122 PARTIAL REMOVAL OF FOOT BONE: CPT | Mod: 79,59 | Performed by: ORTHOPAEDIC SURGERY

## 2022-05-03 PROCEDURE — A9270 NON-COVERED ITEM OR SERVICE: HCPCS | Performed by: STUDENT IN AN ORGANIZED HEALTH CARE EDUCATION/TRAINING PROGRAM

## 2022-05-03 PROCEDURE — 501838 HCHG SUTURE GENERAL: Performed by: ORTHOPAEDIC SURGERY

## 2022-05-03 PROCEDURE — 28735 FUSION OF FOOT BONES: CPT | Mod: 79 | Performed by: ORTHOPAEDIC SURGERY

## 2022-05-03 PROCEDURE — 28750 FUSION OF BIG TOE JOINT: CPT | Mod: 79,59 | Performed by: ORTHOPAEDIC SURGERY

## 2022-05-03 PROCEDURE — 700102 HCHG RX REV CODE 250 W/ 637 OVERRIDE(OP): Performed by: STUDENT IN AN ORGANIZED HEALTH CARE EDUCATION/TRAINING PROGRAM

## 2022-05-03 PROCEDURE — 20680 REMOVAL OF IMPLANT DEEP: CPT | Mod: ASROC,79,LT | Performed by: NURSE PRACTITIONER

## 2022-05-03 PROCEDURE — 160029 HCHG SURGERY MINUTES - 1ST 30 MINS LEVEL 4: Performed by: ORTHOPAEDIC SURGERY

## 2022-05-03 PROCEDURE — 8968 PR NO CHARGE - PROCEDURE: Mod: ASROC,79 | Performed by: NURSE PRACTITIONER

## 2022-05-03 PROCEDURE — C1713 ANCHOR/SCREW BN/BN,TIS/BN: HCPCS | Performed by: ORTHOPAEDIC SURGERY

## 2022-05-03 PROCEDURE — 28750 FUSION OF BIG TOE JOINT: CPT | Mod: ASROC,79,59 | Performed by: NURSE PRACTITIONER

## 2022-05-03 PROCEDURE — 160048 HCHG OR STATISTICAL LEVEL 1-5: Performed by: ORTHOPAEDIC SURGERY

## 2022-05-03 PROCEDURE — 64445 NJX AA&/STRD SCIATIC NRV IMG: CPT | Performed by: ORTHOPAEDIC SURGERY

## 2022-05-03 PROCEDURE — A6223 GAUZE >16<=48 NO W/SAL W/O B: HCPCS | Performed by: ORTHOPAEDIC SURGERY

## 2022-05-03 PROCEDURE — 28285 REPAIR OF HAMMERTOE: CPT | Mod: 79 | Performed by: ORTHOPAEDIC SURGERY

## 2022-05-03 PROCEDURE — 28305 INCISE/GRAFT MIDFOOT BONES: CPT | Mod: ASROC,79,59 | Performed by: NURSE PRACTITIONER

## 2022-05-03 PROCEDURE — 28735 FUSION OF FOOT BONES: CPT | Mod: ASROC,79 | Performed by: NURSE PRACTITIONER

## 2022-05-03 DEVICE — SCREW BONE VARIAX T10 FULL THREAD L12 MM OD3.5 MM FOOT ANKLE LOCK STARDRIVE NONSTERILE: Type: IMPLANTABLE DEVICE | Site: FOOT | Status: FUNCTIONAL

## 2022-05-03 DEVICE — SCREW BONE VARIAX T10 FULL THREAD L22 MM OD3.5 MM FOOT ANKLE STARDRIVE NONSTERILE: Type: IMPLANTABLE DEVICE | Site: FOOT | Status: FUNCTIONAL

## 2022-05-03 DEVICE — WIRE K- SMOOTH .062 - (3TX6=18): Type: IMPLANTABLE DEVICE | Site: FOOT | Status: FUNCTIONAL

## 2022-05-03 DEVICE — HEADLESS COMPRESSION SCREW  5.0MM  L50MM: Type: IMPLANTABLE DEVICE | Site: FOOT | Status: FUNCTIONAL

## 2022-05-03 DEVICE — IMPLANTABLE DEVICE: Type: IMPLANTABLE DEVICE | Site: FOOT | Status: FUNCTIONAL

## 2022-05-03 DEVICE — SCREW BONE VARIAX T10 FULL THREAD L24 MM OD3.5 MM FOOT ANKLE STARDRIVE NONSTERILE: Type: IMPLANTABLE DEVICE | Site: FOOT | Status: FUNCTIONAL

## 2022-05-03 RX ORDER — OXYCODONE HCL 5 MG/5 ML
5 SOLUTION, ORAL ORAL
Status: COMPLETED | OUTPATIENT
Start: 2022-05-03 | End: 2022-05-03

## 2022-05-03 RX ORDER — HYDRALAZINE HYDROCHLORIDE 20 MG/ML
5 INJECTION INTRAMUSCULAR; INTRAVENOUS
Status: DISCONTINUED | OUTPATIENT
Start: 2022-05-03 | End: 2022-05-03 | Stop reason: HOSPADM

## 2022-05-03 RX ORDER — TRANEXAMIC ACID 100 MG/ML
INJECTION, SOLUTION INTRAVENOUS PRN
Status: DISCONTINUED | OUTPATIENT
Start: 2022-05-03 | End: 2022-05-03 | Stop reason: SURG

## 2022-05-03 RX ORDER — CEFAZOLIN SODIUM 1 G/3ML
2 INJECTION, POWDER, FOR SOLUTION INTRAMUSCULAR; INTRAVENOUS ONCE
Status: DISCONTINUED | OUTPATIENT
Start: 2022-05-03 | End: 2022-05-03 | Stop reason: HOSPADM

## 2022-05-03 RX ORDER — HALOPERIDOL 5 MG/ML
1 INJECTION INTRAMUSCULAR
Status: DISCONTINUED | OUTPATIENT
Start: 2022-05-03 | End: 2022-05-03 | Stop reason: HOSPADM

## 2022-05-03 RX ORDER — HYDROMORPHONE HYDROCHLORIDE 1 MG/ML
0.2 INJECTION, SOLUTION INTRAMUSCULAR; INTRAVENOUS; SUBCUTANEOUS
Status: DISCONTINUED | OUTPATIENT
Start: 2022-05-03 | End: 2022-05-03 | Stop reason: HOSPADM

## 2022-05-03 RX ORDER — ACETAMINOPHEN 500 MG
1000 TABLET ORAL ONCE
Status: DISCONTINUED | OUTPATIENT
Start: 2022-05-03 | End: 2022-05-03 | Stop reason: HOSPADM

## 2022-05-03 RX ORDER — LIDOCAINE HYDROCHLORIDE 20 MG/ML
INJECTION, SOLUTION EPIDURAL; INFILTRATION; INTRACAUDAL; PERINEURAL PRN
Status: DISCONTINUED | OUTPATIENT
Start: 2022-05-03 | End: 2022-05-03 | Stop reason: SURG

## 2022-05-03 RX ORDER — ROPIVACAINE HYDROCHLORIDE 5 MG/ML
INJECTION, SOLUTION EPIDURAL; INFILTRATION; PERINEURAL
Status: COMPLETED | OUTPATIENT
Start: 2022-05-03 | End: 2022-05-03

## 2022-05-03 RX ORDER — ONDANSETRON 2 MG/ML
INJECTION INTRAMUSCULAR; INTRAVENOUS PRN
Status: DISCONTINUED | OUTPATIENT
Start: 2022-05-03 | End: 2022-05-03 | Stop reason: SURG

## 2022-05-03 RX ORDER — OXYCODONE HCL 5 MG/5 ML
10 SOLUTION, ORAL ORAL
Status: COMPLETED | OUTPATIENT
Start: 2022-05-03 | End: 2022-05-03

## 2022-05-03 RX ORDER — DEXAMETHASONE SODIUM PHOSPHATE 4 MG/ML
INJECTION, SOLUTION INTRA-ARTICULAR; INTRALESIONAL; INTRAMUSCULAR; INTRAVENOUS; SOFT TISSUE PRN
Status: DISCONTINUED | OUTPATIENT
Start: 2022-05-03 | End: 2022-05-03 | Stop reason: SURG

## 2022-05-03 RX ORDER — HYDROMORPHONE HYDROCHLORIDE 2 MG/ML
INJECTION, SOLUTION INTRAMUSCULAR; INTRAVENOUS; SUBCUTANEOUS PRN
Status: DISCONTINUED | OUTPATIENT
Start: 2022-05-03 | End: 2022-05-03 | Stop reason: SURG

## 2022-05-03 RX ORDER — ONDANSETRON 2 MG/ML
4 INJECTION INTRAMUSCULAR; INTRAVENOUS
Status: DISCONTINUED | OUTPATIENT
Start: 2022-05-03 | End: 2022-05-03 | Stop reason: HOSPADM

## 2022-05-03 RX ORDER — SODIUM CHLORIDE, SODIUM LACTATE, POTASSIUM CHLORIDE, CALCIUM CHLORIDE 600; 310; 30; 20 MG/100ML; MG/100ML; MG/100ML; MG/100ML
INJECTION, SOLUTION INTRAVENOUS CONTINUOUS
Status: DISCONTINUED | OUTPATIENT
Start: 2022-05-03 | End: 2022-05-03 | Stop reason: HOSPADM

## 2022-05-03 RX ORDER — ALBUTEROL SULFATE 2.5 MG/3ML
2.5 SOLUTION RESPIRATORY (INHALATION)
Status: DISCONTINUED | OUTPATIENT
Start: 2022-05-03 | End: 2022-05-03 | Stop reason: HOSPADM

## 2022-05-03 RX ORDER — MEPERIDINE HYDROCHLORIDE 25 MG/ML
12.5 INJECTION INTRAMUSCULAR; INTRAVENOUS; SUBCUTANEOUS
Status: DISCONTINUED | OUTPATIENT
Start: 2022-05-03 | End: 2022-05-03 | Stop reason: HOSPADM

## 2022-05-03 RX ORDER — LABETALOL HYDROCHLORIDE 5 MG/ML
5 INJECTION, SOLUTION INTRAVENOUS
Status: DISCONTINUED | OUTPATIENT
Start: 2022-05-03 | End: 2022-05-03 | Stop reason: HOSPADM

## 2022-05-03 RX ORDER — HYDROMORPHONE HYDROCHLORIDE 1 MG/ML
0.4 INJECTION, SOLUTION INTRAMUSCULAR; INTRAVENOUS; SUBCUTANEOUS
Status: DISCONTINUED | OUTPATIENT
Start: 2022-05-03 | End: 2022-05-03 | Stop reason: HOSPADM

## 2022-05-03 RX ORDER — GABAPENTIN 300 MG/1
600 CAPSULE ORAL ONCE
Status: DISCONTINUED | OUTPATIENT
Start: 2022-05-03 | End: 2022-05-03 | Stop reason: HOSPADM

## 2022-05-03 RX ORDER — MIDAZOLAM HYDROCHLORIDE 1 MG/ML
1 INJECTION INTRAMUSCULAR; INTRAVENOUS
Status: DISCONTINUED | OUTPATIENT
Start: 2022-05-03 | End: 2022-05-03 | Stop reason: HOSPADM

## 2022-05-03 RX ORDER — CEFAZOLIN SODIUM 1 G/3ML
INJECTION, POWDER, FOR SOLUTION INTRAMUSCULAR; INTRAVENOUS PRN
Status: DISCONTINUED | OUTPATIENT
Start: 2022-05-03 | End: 2022-05-03 | Stop reason: SURG

## 2022-05-03 RX ORDER — METOPROLOL TARTRATE 1 MG/ML
1 INJECTION, SOLUTION INTRAVENOUS
Status: DISCONTINUED | OUTPATIENT
Start: 2022-05-03 | End: 2022-05-03 | Stop reason: HOSPADM

## 2022-05-03 RX ORDER — DIPHENHYDRAMINE HYDROCHLORIDE 50 MG/ML
12.5 INJECTION INTRAMUSCULAR; INTRAVENOUS
Status: DISCONTINUED | OUTPATIENT
Start: 2022-05-03 | End: 2022-05-03 | Stop reason: HOSPADM

## 2022-05-03 RX ORDER — HYDROMORPHONE HYDROCHLORIDE 1 MG/ML
0.1 INJECTION, SOLUTION INTRAMUSCULAR; INTRAVENOUS; SUBCUTANEOUS
Status: DISCONTINUED | OUTPATIENT
Start: 2022-05-03 | End: 2022-05-03 | Stop reason: HOSPADM

## 2022-05-03 RX ADMIN — ONDANSETRON 4 MG: 2 INJECTION INTRAMUSCULAR; INTRAVENOUS at 10:39

## 2022-05-03 RX ADMIN — HYDROMORPHONE HYDROCHLORIDE 0.8 MG: 2 INJECTION INTRAMUSCULAR; INTRAVENOUS; SUBCUTANEOUS at 10:27

## 2022-05-03 RX ADMIN — FENTANYL CITRATE 100 MCG: 50 INJECTION, SOLUTION INTRAMUSCULAR; INTRAVENOUS at 10:25

## 2022-05-03 RX ADMIN — HYDROMORPHONE HYDROCHLORIDE 0.2 MG: 2 INJECTION INTRAMUSCULAR; INTRAVENOUS; SUBCUTANEOUS at 12:04

## 2022-05-03 RX ADMIN — OXYCODONE HYDROCHLORIDE 10 MG: 5 SOLUTION ORAL at 12:57

## 2022-05-03 RX ADMIN — ROPIVACAINE HYDROCHLORIDE 30 ML: 5 INJECTION, SOLUTION EPIDURAL; INFILTRATION; PERINEURAL at 10:15

## 2022-05-03 RX ADMIN — MIDAZOLAM 2 MG: 1 INJECTION INTRAMUSCULAR; INTRAVENOUS at 10:24

## 2022-05-03 RX ADMIN — HYDROMORPHONE HYDROCHLORIDE 0.2 MG: 2 INJECTION INTRAMUSCULAR; INTRAVENOUS; SUBCUTANEOUS at 10:55

## 2022-05-03 RX ADMIN — SODIUM CHLORIDE, POTASSIUM CHLORIDE, SODIUM LACTATE AND CALCIUM CHLORIDE: 600; 310; 30; 20 INJECTION, SOLUTION INTRAVENOUS at 10:10

## 2022-05-03 RX ADMIN — EPHEDRINE SULFATE 10 MG: 50 INJECTION INTRAMUSCULAR; INTRAVENOUS; SUBCUTANEOUS at 11:36

## 2022-05-03 RX ADMIN — TRANEXAMIC ACID 2000 MG: 100 INJECTION, SOLUTION INTRAVENOUS at 12:21

## 2022-05-03 RX ADMIN — LIDOCAINE HYDROCHLORIDE 100 MG: 20 INJECTION, SOLUTION EPIDURAL; INFILTRATION; INTRACAUDAL at 10:26

## 2022-05-03 RX ADMIN — HYDROMORPHONE HYDROCHLORIDE 0.4 MG: 2 INJECTION INTRAMUSCULAR; INTRAVENOUS; SUBCUTANEOUS at 12:14

## 2022-05-03 RX ADMIN — PROPOFOL 50 MG: 10 INJECTION, EMULSION INTRAVENOUS at 12:04

## 2022-05-03 RX ADMIN — HYDROMORPHONE HYDROCHLORIDE 0.4 MG: 2 INJECTION INTRAMUSCULAR; INTRAVENOUS; SUBCUTANEOUS at 12:07

## 2022-05-03 RX ADMIN — PROPOFOL 150 MG: 10 INJECTION, EMULSION INTRAVENOUS at 10:26

## 2022-05-03 RX ADMIN — DEXAMETHASONE SODIUM PHOSPHATE 4 MG: 4 INJECTION, SOLUTION INTRA-ARTICULAR; INTRALESIONAL; INTRAMUSCULAR; INTRAVENOUS; SOFT TISSUE at 10:28

## 2022-05-03 RX ADMIN — CEFAZOLIN 2 G: 330 INJECTION, POWDER, FOR SOLUTION INTRAMUSCULAR; INTRAVENOUS at 10:27

## 2022-05-03 ASSESSMENT — PAIN DESCRIPTION - PAIN TYPE
TYPE: ACUTE PAIN
TYPE: SURGICAL PAIN

## 2022-05-03 NOTE — ANESTHESIA TIME REPORT
Anesthesia Start and Stop Event Times     Date Time Event    5/3/2022 1020 Ready for Procedure     1021 Anesthesia Start     1247 Anesthesia Stop        Responsible Staff  05/03/22    Name Role Begin End    Mario Clark M.D. Anesth 1021 1247        Overtime Reason:  no overtime (within assigned shift)    Comments:

## 2022-05-03 NOTE — OR NURSING
Pt's VSS; denies N/V; states pain is at tolerable level. Dressing CDI to LLE. Awake and alert    1400 D/c orders received. IV dc'd. Pt changed into clothing with assistance. Discharge instructions given as well ; pt and family verbalized understanding and questions answered. Patient states ready to d/c home.  Pt dc'd in w/c with CNA.

## 2022-05-03 NOTE — LETTER
April 27, 2022    Patient Name: Irene Figueroa  Surgeon Name: Mario Antoine M.D.  Surgery Facility: [facility] ([facility])  Surgery Date: 5/3/2022    The time of your surgery is not final and may change up to and until the day of your surgery. You will be contacted 24-48 hours prior to your surgery date with your check-in and surgery time.    If you will not be at one of the below numbers please call/text the surgery scheduler at [phone]  Preferred Phone: [phone]    BEFORE YOUR SURGERY   Pre Registration and/or Lab Work must be done within and no earlier than 28 days prior to your surgery date. Please call [facility] at [phone] for an appointment as soon as possible.    COVID test requied prior to surgery for all unvaccinated patients, failure to do so can result in a cancellation.    Please refrain from smoking any substance after midnight prior to surgery. Smoking may interfere with the anesthetic and frequently produces nausea during the recovery period.    Continue taking all lifesaving medications. Including the morning of your surgery with small sip of water.    Please read the MEDICATION INSTRUCTIONS below completely.    DAY OF YOUR SURGERY  Nothing to eat or drink after midnight     Please arrive at the hospital/surgery center at the check-in time provided.     An adult will need to bring you and take you home after your surgery.     AFTER YOUR SURGERY  Post op Appointment:   Date: 5/17/22   Time: 9:00 AM   With: Mario Antoine M.D.   Location: [address] [place] [address]        TIME OFF WORK  FMLA or Disability forms can be faxed directly to: [phone] or you may drop them off at [address] [place] [address]. Our office charges a $35.00 fee per form. Forms will be completed within 10 business days of drop off and payment received. For the status of your forms you may contact our disability office directly [phone].    MEDICATION INSTRUCTIONS  The following medications should be stopped a minimum of 10 days prior to surgery:  All over the counter, Supplements & Herbal medications    Anorectics: Phentermine (Adipex-P, Lomaira and Suprenza), Phentermine-topiramate (Qsymia), Bupropion-naltrexone (Contrave)    Opiod Partial Agonists/Opioid Antagonists: Buprenorphine (Subocone, Belbuca, Butrans, Probuphine Implant, Sublocade), Naltrexone (ReVia, Vivitrol), Naloxone    Amphetamines: Dextroamphetamine/Amphetamine (Adderall, Mydayis), Methylphenidate Hydrochloride (Concerta, Metadate, Methylin, Ritalin)    The following medications should be stopped 5 days prior to surgery:  Blood Thinners: Any Aspirin, Aspirin products, anti-inflammatories such as ibuprofen and any blood thinners such as Coumadin and Plavix. Please consult your prescribing physician if you are on life saving blood thinners, in regards to when to stop medications prior to surgery.     The following medications should be stopped a minimum of 3 days prior to surgery:  PDE-5 inhibitors: Sildenafil (Viagra), Tadalafil (Cialis), Vardenafil (Levitra), Avanafil (Stendra)    MAO Inhibitors: Rasagiline (Azilect), Selegiline (Eldepryl, Emsam, Selapar), Isocarboxazid (Marplan), Phenelzine (Nardil)

## 2022-05-03 NOTE — ANESTHESIA PROCEDURE NOTES
Peripheral Block    Date/Time: 5/3/2022 10:15 AM  Performed by: Mario Clark M.D.  Authorized by: Mario Clark M.D.     Start Time:  5/3/2022 10:15 AM  End Time:  5/3/2022 10:17 AM  Reason for Block: at surgeon's request and post-op pain management ONLY    patient identified, IV checked, site marked, risks and benefits discussed, surgical consent, monitors and equipment checked, pre-op evaluation and timeout performed    Patient Position:  Supine  Prep: ChloraPrep    Monitoring:  Heart rate, continuous pulse ox and cardiac monitor  Block Region:  Lower Extremity  Lower Extremity - Block Type:  SCIATIC nerve block, lateral approach    Laterality:  Left  Procedures: ultrasound guided  Image captured, interpreted and electronically stored.  Local Infiltration:  Lidocaine  Strength:  1 %  Dose:  3 ml  Block Type:  Single-shot  Needle Length:  100mm  Needle Gauge:  21 G  Needle Localization:  Ultrasound guidance  Injection Assessment:  Negative aspiration for heme, no paresthesia on injection, incremental injection and local visualized surrounding nerve on ultrasound  Evidence of intravascular injection: No     US Guided Sciatic Nerve Block   US probe placed several cm proximal to popliteal crease on posterior thigh and scanned caudad and cephalad until Sciatic Nerve (SN) identified superficial/lateral to popliteal artery.  Needle inserted lateral to probe in an in plane approach under direct visualization to a perineural position.  After negative aspiration LA injected with ease and visualized surrounding the SN.

## 2022-05-03 NOTE — ANESTHESIA PROCEDURE NOTES
Airway    Date/Time: 5/3/2022 10:27 AM  Performed by: Mario Clark M.D.  Authorized by: Mario Clark M.D.     Location:  OR  Urgency:  Elective  Indications for Airway Management:  Anesthesia      Spontaneous Ventilation: absent    Sedation Level:  Deep  Preoxygenated: Yes    Final Airway Type:  Supraglottic airway  Final Supraglottic Airway:  Standard LMA    SGA Size:  5  Number of Attempts at Approach:  1  Ventilation Between Attempts:  None  Number of Other Approaches Attempted:  0

## 2022-05-03 NOTE — DISCHARGE INSTRUCTIONS
ACTIVITY: Rest and take it easy for the first 24 hours.  A responsible adult is recommended to remain with you during that time.  It is normal to feel sleepy.  We encourage you to not do anything that requires balance, judgment or coordination.    MILD FLU-LIKE SYMPTOMS ARE NORMAL. YOU MAY EXPERIENCE GENERALIZED MUSCLE ACHES, THROAT IRRITATION, HEADACHE AND/OR SOME NAUSEA.    FOR 24 HOURS DO NOT:  Drive, operate machinery or run household appliances.  Drink beer or alcoholic beverages.   Make important decisions or sign legal documents.    SPECIAL INSTRUCTIONS: See any additional instructions you may have in your [facility] folder    Non Weight Bearing  Left Lower leg  Ice and elevate  Stay ahead of pain  Keep splint clean and dry  Aspirin 81mg two times a day for clot prevention    DIET: To avoid nausea, slowly advance diet as tolerated, avoiding spicy or greasy foods for the first day.  Add more substantial food to your diet according to your physician's instructions.  INCREASE FLUIDS AND FIBER TO AVOID CONSTIPATION.    SURGICAL DRESSING/BATHING:     FOLLOW-UP APPOINTMENT:  A follow-up appointment should be arranged with your doctor in 1-2 weeks; call to schedule.    You should CALL YOUR PHYSICIAN if you develop:  Fever greater than 101 degrees F.  Pain not relieved by medication, or persistent nausea or vomiting.  Excessive bleeding (blood soaking through dressing) or unexpected drainage from the wound.  Extreme redness or swelling around the incision site, drainage of pus or foul smelling drainage.  Inability to urinate or empty your bladder within 8 hours.  Problems with breathing or chest pain.    You should call 911 if you develop problems with breathing or chest pain.  If you are unable to contact your doctor or surgical center, you should go to the nearest emergency room or urgent care center.    Physician's telephone #: [phone] Dr Antoine    If any questions arise, call your doctor.  If your doctor is  not available, please feel free to call the Surgical Center at [phone].     A registered nurse may call you a few days after your surgery to see how you are doing after your procedure.    MEDICATIONS: Resume taking daily medication.  Take prescribed pain medication with food.  If no medication is prescribed, you may take non-aspirin pain medication if needed.  PAIN MEDICATION CAN BE VERY CONSTIPATING.  Take a stool softener or laxative such as senokot, pericolace, or milk of magnesia if needed.    Prescription given for Kelfex, Oxycodone, Neurontin and Vistaril sent to your [facility]..  Last pain medication given at Oxycodone 10mg at 12:57pm    If your physician has prescribed pain medication that includes Acetaminophen (Tylenol), do not take additional Acetaminophen (Tylenol) while taking the prescribed medication.    Depression / Suicide Risk    As you are discharged from this [facility] facility, it is important to learn how to keep safe from harming yourself.    Recognize the warning signs:  · Abrupt changes in personality, positive or negative- including increase in energy   · Giving away possessions  · Change in eating patterns- significant weight changes-  positive or negative  · Change in sleeping patterns- unable to sleep or sleeping all the time   · Unwillingness or inability to communicate  · Depression  · Unusual sadness, discouragement and loneliness  · Talk of wanting to die  · Neglect of personal appearance   · Rebelliousness- reckless behavior  · Withdrawal from people/activities they love  · Confusion- inability to concentrate     If you or a loved one observes any of these behaviors or has concerns about self-harm, here's what you can do:  · Talk about it- your feelings and reasons for harming yourself  · Remove any means that you might use to hurt yourself (examples: pills, rope, extension cords, firearm)  · Get professional help from the community (Mental Health, Substance Abuse,  psychological counseling)  · Do not be alone:Call your Safe Contact- someone whom you trust who will be there for you.  · Call your local CRISIS HOTLINE 800-8829 or 208-152-8082  · Call your local Children's Mobile Crisis Response Team [place] (226) 341-0399 or www.[employer]ian  · Call the toll free National Suicide Prevention Hotlines   · National Suicide Prevention Lifeline 606-439-NLUR (0063)  · National Hope Line Network 800-SUICIDE (563-9296)

## 2022-05-03 NOTE — ANESTHESIA PREPROCEDURE EVALUATION
Case: [id number] Date/Time: 05/03/22 1045    Procedures:       LEFT TENDO-ACHILLES LENGTHENING, REMOVAL OF HARDWARE CALCANEUS, CALCANEO-CUBOID, TALO-NAVICULAR,  METATARSALPHALANGEAL, 5TH TOE, TALO-NAVICULAR AND CALCANEO-CUBOID, OSTEOTOMY WITH FUSION, CALCANEUS AUTOGRAFT, EXCISION TALUS, DORSI FLEXTION 1ST RAY OSTEOTOMY, 2-5 HAMMER TOE (Left )      BONE GRAFT      TENOTOMY, LOWER BODY      FUSION AND ARTHROEREISIS, JOINT, FOOT AND ANKLE      Osteotomy,Tarsal Bone      EXCISION, METATARSAL BONE, HEAD      ARTHROPLASTY, TOE      LENGTHENING, TENDON      OSTEOTOMY      CORRECTION, HAMMER TOE    Diagnosis: Left club foot [Q66.89]    Pre-op diagnosis: Left club foot [Q66.89]    Location: [place] [address] / SURGERY [facility]    Surgeons: Mario Antoine M.D.          Relevant Problems   No relevant active problems       Physical Exam    Airway   Mallampati: II  TM distance: >3 FB  Neck ROM: full       Cardiovascular - normal exam  Rhythm: regular  Rate: normal  (-) murmur     Dental - normal exam           Pulmonary - normal exam  Breath sounds clear to auscultation     Abdominal    Neurological - normal exam                 Anesthesia Plan    ASA 2       Plan - general and peripheral nerve block     Peripheral nerve block will be post-op pain control  Airway plan will be LMA          Induction: intravenous    Postoperative Plan: Postoperative administration of opioids is intended.    Pertinent diagnostic labs and testing reviewed    Informed Consent:    Anesthetic plan and risks discussed with patient.    Use of blood products discussed with: patient whom consented to blood products.

## 2022-05-03 NOTE — OR NURSING
Awake, alert and tolerating PO fluids.  Medicated for pain.  Dressings clean, dry and intact  Vitals stable.  On monitors with alarms audible.    Called mom with update and approximate DC timeline.

## 2022-05-04 ASSESSMENT — PAIN SCALES - GENERAL: PAIN_LEVEL: 4

## 2022-05-04 NOTE — OP REPORT
DATE OF SERVICE:  05/03/2022     PREOPERATIVE DIAGNOSES:  1.  Left clubfoot deformity.  2.  Left subtalar, talonavicular and calcaneocuboid fusions with malunions.  3.  Left ankle equinus contracture.  4.  Left great MTP fusion with malunion.  5.  Left second, third, fourth and fifth hammertoes.  6.  Retained orthopedic hardware.  7.  Bony exostosis talus.     POSTOPERATIVE DIAGNOSES:  1.  Left clubfoot deformity.  2.  Left subtalar, talonavicular and calcaneocuboid fusions with malunions.  3.  Left ankle equinus contracture.  4.  Left great MTP fusion with malunion.  5.  Left second, third, fourth and fifth hammertoes.  6.  Retained orthopedic hardware.  7.  Bony exostosis talus.     PROCEDURES:  1.  Removal of hardware, left calcaneus.  2.  Removal of hardware, left calcaneocuboid joint.  3.  Removal of hardware, left talonavicular joint.  4.  Removal of hardware, left great metatarsophalangeal joint.  5.  Removal of hardware, left fifth metatarsophalangeal joint.   6.  Left tendo-Achilles lengthening.  7.  Left calcaneocuboid fusion takedown and revision with osteotomy.  8.  Left talonavicular takedown and fusion with dorsiflexion osteotomy.  9.  Large bone autograft.  10.  Revision great metatarsophalangeal fusion with osteotomy.  11.  Plantar flexion osteotomy, first ray.  12.  Second, third, fourth and fifth hammertoe corrections.     SURGEON:  Mario Antoine MD     ASSISTANT:  EUGENIO Galeas     ANESTHESIA:  General with peripheral nerve block requested by me for   postoperative pain control.     ESTIMATED BLOOD LOSS:  25 mL     TOURNIQUET TIME:  105 minutes at 250 mmHg.     IMPLANTS:  1.  Kole 5.0 mm headless compression screws x3.  2.  [place] 20 mm compression staple.  3.  [place] broad Y VariAx plate with 3.5 mm locking and nonlocking screws.  4.  0.062 K-wires x4.     COMPLICATIONS:  None.     OUTCOME:  PACU in stable condition.     HISTORY OF PRESENT ILLNESS:  This is a 46-year-old  gentleman with bilateral   clubfoot deformity who underwent triple fusions at an outside institution.  I   have revised the right side.  He has been quite happy with this despite   noncompliance and he would like to have the left done.  We discussed the   necessity of being nonweightbearing postoperatively and he has agreed to be   more compliant at this time around.  We will transition him to a boot next   visit, but I would like him to maintain touchdown weightbearing status.  He   has agreed to at least attempt to be compliant.  He was greeted in the   preoperative holding area and identified by name and medical record number.    The left lower extremity was marked.  Risks of procedure including bleeding,   infection, pain, malunion, nonunion, neurovascular damage, need for more   surgery were discussed and provided written consent.     DESCRIPTION OF PROCEDURE:  He was taken to the operating room and placed on   table in supine position.  Preoperative antibiotics were administered.    General anesthesia was induced.  Nonsterile tourniquet was placed on his left   thigh.  Left lower extremity prepped and draped in the usual sterile fashion.    An operative pause was taken where all present were in agreement with patient   identification, laterality, and procedure to be performed.  The limb was   elevated for 5 minutes, tourniquet raised to 250 mmHg.     I made three half cuts in the Achilles tendon, two medial and one lateral and   obtained about 10 degrees of additional dorsiflexion with the knee in   extension.  Each of these incisions was closed with a simple 3-0 nylon.     I made a 2 cm longitudinal incision in the plantar heel to access both   headless compression screws that were there under direct radiographic   guidance, we cleaned the heads with a small curette and then removed it with a T15   .  Each screw tract was curetted and this incision was copiously   irrigated and closed.     We reopened  the lateral incision over the calcaneocuboid joint.  We removed   the suture knot that was in the peroneus brevis tendon for an unknown reason.    We then dissected around the peroneal tendons to access the plate over the   calcaneocuboid joint.  We removed 3 screws plus the head of the fourth screw   that unfortunately broke off.  I did not feel the screw would cause any issue,   so it was left.  The plate was removed.  Each screw tract was curetted and   irrigated.     We reopened the anteromedial incision to access the talonavicular hardware.    Blunt dissection was carried down to the plate, 4 screws in the plate were   able to be removed.  Each screw tract was curetted.  We bluntly dissected over   the talar neck and then plantar along the talonavicular fusion.  We did this   around the calcaneocuboid joint as well in preparation for the osteotomies.     We reopened the dorsal great MTP incision with sharp dissection carried down   to the plate.  It was found to be significantly dorsiflexed and rotated at the   fusion site, deformity that would become exacerbated once the alignment of   the foot was corrected.  We removed 6 screws and the plate.  We used a rongeur   to remove any remaining sharp bone.  We curetted each screw tract.  We then   made a longitudinal incision over the dorsal fifth metatarsophalangeal joint.    A rongeur was needed to uncover the screw head that was within the fifth toe   and metatarsal and it was able to be removed.  At this point, all hardware was   removed and we proceeded with correction of his deformity.  I drove K-wires   through the talonavicular joint and the calcaneocuboid joint in the direction   of my planned osteotomy.  Once I felt this was appropriate, radiographically,   we used an oscillating saw to first make a vertical cut in each all the way   through and mobilized the foot.  We then removed a dorsal wedge of bone larger   from the talonavicular joint and smaller  from the calcaneocuboid joint.  We   then derotated the foot closed our dorsal wedges down which nicely corrected   the balance, particularly of the lateral column.  Once we felt it was in   proper alignment, we drilled each of the bone surfaces to be fused.  We took   the bone wedges that we removed and morselized and the cancellous autograft.    We impacted into each of the osteotomy sites.  We then pinned it under direct   radiographic guidance, we pinned for 5 mm headless compression screws.  Once   satisfied with the alignment of the screws, we drilled for and placed 2 screws   across the talonavicular joint and one across the calcaneocuboid joint.  The   screw and the calcaneocuboid joint did not have his nice purchase as I liked,   so we used a 20 mm compression staple to augment the repair.  This had   excellent purchase and maintenance of reduction.  At this point, as expected,   the first ray was quite plantar flexed the metatarsophalangeal joint clearly   needed to be revised.  In addition, the patient had displaced the staple used   for his dorsiflexion osteotomy on the other side, so we elected for a plate.    We used an oscillating saw to perform a dorsiflexion osteotomy of the first   ray, removing a dorsal wedge of bone and keep the plantar cortex intact. As   expected this exaggerated the dorsiflexion and rotation deformity of the great   MTP joint.  We then used the oscillating saw to cut through this osteotomy   site then plantar flexed it through the old joint.  We took the wedge of bone   from the more proximal aspect of the first metatarsal and placed it within the   metatarsophalangeal joint.  This allowed us nice correction of alignment.  We   packed bone graft around each of the osteotomy sites and the bone wedge that   we had removed from the proximal metatarsophalangeal was placed into the osteotomy at the joint.  We neutralized this with a   long broad shaped Y plate with a combination of  locking and nonlocking 3.5 mm   screws.  At this point, we had excellent balance between the heel, the first   and the fifth metatarsal heads with marked improvement from his preoperative   state.  We have previously discussed that I was not going to do a calcaneal   osteotomy secondary to his lack of compliance and my fear that it would not   heal.  Additionally, the left foot had been corrected a bit more appropriately   than the right, so I did not think it was completely necessary.  Evaluating   the shape of the foot, there was significant overhanging bone, both medially   and laterally in the talus.  We used an oscillating saw to remove it to our   satisfaction, both radiographically and to palpation.  Now, the lesser toes   needed to be addressed.  We made maryse incisions over the distal   interphalangeal joints of each.  We used a 2.6 mm drill to drill out the   joints and then held in appropriate alignment.  We drove a 0.062 K-wire   through the toe and into the metatarsal holding alignment across the foot.   Tourniquet was let down.  Hemostasis was achieved and  we gave him a dose of   tranexamic acid.  All hardware was felt to be in proper alignment and the   alignment of his foot was markedly improved.  No evidence of interval   complication.  Each incision was copiously irrigated.  Subcutaneous layer was   closed with 3-0 Monocryl in buried interrupted fashion.  Skin closed with 3-0   nylon in horizontal mattress fashion.  Adaptic gauze and a well-padded   posterior splint with stirrup were placed.  He was awakened and extubated in   stable condition.     POSTOPERATIVE COURSE:  He will be discharged to home today assuming adequate   pain control and mobilization.  He will stay in the splint until his initial   scheduled followup visit and we will not remove it.  I have told this to him   and his mother that it needs to stay in place and if I am going to continue to   care for him, he needs to listen to at  least a few of our recommendations.    We will see him in 2 weeks' time, at which point, we can transition him to a   boot.  I do think he should be in the boot at all times.  I think he should   maintain heel weightbearing only.  He may come out of the boot once he heals   radiographically entirely, likely 8-10 weeks.        ______________________________  MD VIN PORTER/ZI/BILL    DD:  05/03/2022 13:26  DT:  05/03/2022 16:41    Job#:  [id number]

## 2022-05-05 NOTE — ANESTHESIA POSTPROCEDURE EVALUATION
Patient: Irene Figueroa    Procedure Summary     Date: 05/03/22 Room / Location: [place] [address] / SURGERY [facility]    Anesthesia Start: 1021 Anesthesia Stop: 1247    Procedures:       LEFT TENDO-ACHILLES LENGTHENING, REMOVAL OF HARDWARE CALCANEUS, CALCANEO-CUBOID, TALO-NAVICULAR,  METATARSALPHALANGEAL, 5TH TOE, TALO-NAVICULAR AND CALCANEO-CUBOID, OSTEOTOMY WITH FUSION, CALCANEUS AUTOGRAFT, EXCISION TALUS, DORSI FLEXTION 1ST RAY OSTEOTOMY, 2-5 HAMMER TOE (Left Foot)      BONE GRAFT (Left Foot)      TENOTOMY, LOWER BODY (Left Foot)      FUSION AND ARTHRODESIS , JOINT, FOOT AND ANKLE REVISION MTP FUSION (Left Foot)      Osteotomy,Tarsal Bone (Left Foot)      EXCISION, METATARSAL BONE, HEAD (Left Foot)      ARTHROPLASTY, TOE (Left Foot)      LENGTHENING, TENDON (Left Foot)      OSTEOTOMY (Left Foot)      CORRECTION, HAMMER TOE (Left Foot) Diagnosis:       Left club foot      (club foot of the Left lower extremity )    Surgeons: Mario Antoine M.D. Responsible Provider: Mario Clark M.D.    Anesthesia Type: general, peripheral nerve block ASA Status: 2          Final Anesthesia Type: general, peripheral nerve block  Last vitals  BP   Blood Pressure: 124/83    Temp   36.2 °C (97.1 °F)    Pulse   85   Resp   (!) 34    SpO2   95 %      Anesthesia Post Evaluation    Patient location during evaluation: PACU  Patient participation: complete - patient participated  Level of consciousness: awake and alert  Pain score: 4    Airway patency: patent  Anesthetic complications: no  Cardiovascular status: hemodynamically stable  Respiratory status: acceptable  Hydration status: euvolemic    PONV: none          No complications documented.     Nurse Pain Score: 4 (NPRS)

## 2022-11-02 ENCOUNTER — PATIENT MESSAGE (OUTPATIENT)
Dept: HEALTH INFORMATION MANAGEMENT | Facility: OTHER | Age: 47
End: 2022-11-02

## 2023-11-24 ENCOUNTER — HOSPITAL ENCOUNTER (OUTPATIENT)
Dept: RADIOLOGY | Facility: MEDICAL CENTER | Age: 48
End: 2023-11-24
Attending: ORTHOPAEDIC SURGERY
Payer: MEDICARE

## 2023-11-24 DIAGNOSIS — M19.071 ARTHRITIS OF RIGHT ANKLE: ICD-10-CM

## 2023-11-24 PROCEDURE — 73700 CT LOWER EXTREMITY W/O DYE: CPT | Mod: RT

## 2023-12-05 PROBLEM — M79.671 RIGHT FOOT PAIN: Status: ACTIVE | Noted: 2023-12-05

## 2024-02-02 PROBLEM — M21.6X1 ACQUIRED CAVOVARUS DEFORMITY OF RIGHT FOOT: Status: ACTIVE | Noted: 2024-02-02

## 2024-05-28 ENCOUNTER — HOSPITAL ENCOUNTER (OUTPATIENT)
Dept: RADIOLOGY | Facility: MEDICAL CENTER | Age: 49
End: 2024-05-28
Attending: ORTHOPAEDIC SURGERY
Payer: MEDICARE

## 2024-05-28 DIAGNOSIS — Z98.1 HISTORY OF ARTHRODESIS: ICD-10-CM

## 2024-05-28 DIAGNOSIS — Q66.89 LEFT CLUB FOOT: ICD-10-CM

## 2024-08-20 PROBLEM — Q66.89 LEFT CLUB FOOT: Status: ACTIVE | Noted: 2024-08-20

## 2024-08-20 PROBLEM — Z96.9 RETAINED ORTHOPEDIC HARDWARE: Status: ACTIVE | Noted: 2024-08-20

## 2024-09-09 PROBLEM — I10 HTN (HYPERTENSION): Status: ACTIVE | Noted: 2024-09-09

## 2025-01-07 PROBLEM — M89.8X6: Status: ACTIVE | Noted: 2025-01-07

## 2025-03-13 ENCOUNTER — HOSPITAL ENCOUNTER (OUTPATIENT)
Dept: RADIOLOGY | Facility: MEDICAL CENTER | Age: 50
End: 2025-03-13
Attending: ORTHOPAEDIC SURGERY
Payer: MEDICARE

## 2025-03-13 DIAGNOSIS — M19.071 ARTHRITIS OF RIGHT ANKLE: ICD-10-CM

## 2025-03-13 PROCEDURE — 73700 CT LOWER EXTREMITY W/O DYE: CPT | Mod: RT

## (undated) DEVICE — MASK, LARYNGEAL AIRWAY #5

## (undated) DEVICE — BIT DRILL DIA3.5MM CANNULATED L ADD ON FITTING DISPOSABLE FOR 7MM HEADLESS COMPRESSION SCREW

## (undated) DEVICE — PACK LOWER EXTREMITY - (2/CA)

## (undated) DEVICE — GLOVE BIOGEL SZ 7 SURGICAL PF LTX - (50PR/BX 4BX/CA)

## (undated) DEVICE — ELECTRODE 850 FOAM ADHESIVE - HYDROGEL RADIOTRNSPRNT (50/PK)

## (undated) DEVICE — SUTURE 0 VICRYL PLUS CT-2 - 8 X 18 INCH (12/BX)

## (undated) DEVICE — NEEDLE SAFETY 18 GA X 1 1/2 IN (100EA/BX)

## (undated) DEVICE — HEAD HOLDER JUNIOR/ADULT

## (undated) DEVICE — CANISTER SUCTION 3000ML MECHANICAL FILTER AUTO SHUTOFF MEDI-VAC NONSTERILE LF DISP  (40EA/CA)

## (undated) DEVICE — TUBING PUMP WITH CONNECTOR REDEUCE (1EA)

## (undated) DEVICE — NEEDLE W/FACET S TIP ECHOGENIC W/STIMULATION CABLE SONOPLEX II 21G X 4IN (10EA/BX)

## (undated) DEVICE — SHAVER 3.5 SM JT AGGRES.MEN. (5EA/BX)

## (undated) DEVICE — DRESSING 3X8 ADAPTIC GAUZE - NON-ADHERING (36/PK 6PK/BX)

## (undated) DEVICE — MASK ANESTHESIA ADULT  - (100/CA)

## (undated) DEVICE — SET LEADWIRE 5 LEAD BEDSIDE DISPOSABLE ECG (1SET OF 5/EA)

## (undated) DEVICE — GOWN SURGEONS X-LARGE - DISP. (30/CA)

## (undated) DEVICE — Device

## (undated) DEVICE — SET EXTENSION WITH 2 PORTS (48EA/CA) ***PART #2C8610 IS A SUBSTITUTE*****

## (undated) DEVICE — WRAP CO-FLEX 4IN X 5YD STERIL - SELF-ADHERENT (18/CA)

## (undated) DEVICE — GLOVE SZ 7.5 BIOGEL PI MICRO - PF LF (50PR/BX)

## (undated) DEVICE — BIT DRILL DIA2.6MM SCALED FOR VARIAX 2 WRIST FUSION LOCKING PLATE SYSTEM

## (undated) DEVICE — SUTURE 3-0 ETHILON PS-1 (36PK/BX)

## (undated) DEVICE — STOCKINET BIAS 6 IN STERILE - (20/CA)

## (undated) DEVICE — GLOVE BIOGEL SZ 8 SURGICAL PF LTX - (50PR/BX 4BX/CA)

## (undated) DEVICE — DRAPE 36X28IN RAD CARM BND BG - (25/CA) O

## (undated) DEVICE — CONTAINER, SPECIMEN, STERILE

## (undated) DEVICE — SODIUM CHL. IRRIGATION 0.9% 3000ML (4EA/CA 65CA/PF)

## (undated) DEVICE — PROTECTOR ULNA NERVE - (36PR/CA)

## (undated) DEVICE — GLOVE BIOGEL PI INDICATOR SZ 7.5 SURGICAL PF LF -(50/BX 4BX/CA)

## (undated) DEVICE — SUCTION INSTRUMENT YANKAUER BULBOUS TIP W/O VENT (50EA/CA)

## (undated) DEVICE — SENSOR SPO2 NEO LNCS ADHESIVE (20/BX) SEE USER NOTES

## (undated) DEVICE — NEPTUNE 4 PORT MANIFOLD - (20/PK)

## (undated) DEVICE — TOURNIQUET CUFF 34 X 4 ONE PORT DISP - STERILE (10/BX)

## (undated) DEVICE — SUTURE 3-0 ETHILON FS-1 - (36/BX) 30 INCH

## (undated) DEVICE — SUTURE 3-0 MONOCRYL SH (36PK/BX)

## (undated) DEVICE — SLEEVE, VASO, THIGH, MED

## (undated) DEVICE — SHAVER 3.5 AGGRESSIVE + FORMLA (5EA/SP)

## (undated) DEVICE — DRESSING ABDOMINAL PAD STERILE 8 X 10" (360EA/CA)"

## (undated) DEVICE — TUBING CLEARLINK DUO-VENT - C-FLO (48EA/CA)

## (undated) DEVICE — GLOVE SIZE 7.0 SURGEON ACCELERATOR FREE GREEN (50PR/BX 4BX/CA)

## (undated) DEVICE — BLADE SURGICAL #15 - (50/BX 3BX/CA)

## (undated) DEVICE — PIN GUARD GREEN .62IN 1.6MM (2EA/PK 40PK/BX) MUST ORDER 2 BOX MINIMUM

## (undated) DEVICE — LACTATED RINGERS INJ 1000 ML - (14EA/CA 60CA/PF)

## (undated) DEVICE — DRESSING 3X3 ADAPTIC GAUZE - (50EA/CT)

## (undated) DEVICE — SODIUM CHL IRRIGATION 0.9% 1000ML (12EA/CA)

## (undated) DEVICE — TUBING PATIENT W/CONNECTOR REDEUCE (1EA)

## (undated) DEVICE — PAD LAP STERILE 18 X 18 - (5/PK 40PK/CA)

## (undated) DEVICE — PADDING CAST 6 IN STERILE - 6 X 4 YDS (24/CA)

## (undated) DEVICE — GOWN WARMING STANDARD FLEX - (30/CA)

## (undated) DEVICE — ELECTRODE DUAL RETURN W/ CORD - (50/PK)

## (undated) DEVICE — TOURNIQUET CUFF 24 X 4 ONE PORT - STERILE (10/BX)

## (undated) DEVICE — GLOVE SZ 7 BIOGEL PI MICRO - PF LF (50PR/BX 4BX/CA)

## (undated) DEVICE — GLOVE BIOGEL ECLIPSE PF LATEX SIZE 8.0 (50PR/BX)

## (undated) DEVICE — GLOVE BIOGEL PI ORTHO SZ 7.5 PF LF (40PR/BX)

## (undated) DEVICE — DRAPE C-ARM LARGE 41IN X 74 IN - (10/BX 2BX/CA)

## (undated) DEVICE — GLOVE BIOGEL INDICATOR SZ 7SURGICAL PF LTX - (50/BX 4BX/CA)

## (undated) DEVICE — SLEEVE VASO DVT COMPRESSION CALF MED - (10PR/CA)

## (undated) DEVICE — SPLINT PLASTER 5 IN X 30 IN - (50EA/BX 6BX/CA)

## (undated) DEVICE — SUTURE GENERAL

## (undated) DEVICE — TOWEL STOP TIMEOUT SAFETY FLAG (40EA/CA)

## (undated) DEVICE — MASK ANESTHESIA ADULT - (100/CA)

## (undated) DEVICE — GLOVE BIOGEL PI INDICATOR SZ 7.0 SURGICAL PF LF - (50/BX 4BX/CA)

## (undated) DEVICE — SUTURE 3-0 MONOCRYL PLUS PS-1 - 27 INCH (36/BX)

## (undated) DEVICE — GLOVE BIOGEL PI INDICATOR SZ 6.5 SURGICAL PF LF - (50/BX 4BX/CA)

## (undated) DEVICE — DRAPE LARGE 3 QUARTER - (20/CA)

## (undated) DEVICE — GLOVE BIOGEL INDICATOR SZ 8.5 SURGICAL PF LTX - (50/BX 4BX/CA)

## (undated) DEVICE — IRRIGATION TUBING

## (undated) DEVICE — BAG SPONGE COUNT 10.25 X 32 - BLUE (250/CA)

## (undated) DEVICE — SHAVER 4.0 BARREL FORMULA (5EA/BX)

## (undated) DEVICE — CANISTER SUCTION 3000ML MECHANICAL FILTER AUTO SHUTOFF MEDI-VAC NONSTERILE LF DISP (40EA/CA)

## (undated) DEVICE — CHLORAPREP 26 ML APPLICATOR - ORANGE TINT(25/CA)

## (undated) DEVICE — GLOVE BIOGEL PI ORTHO SZ 8 PF LF (40PR/BX)

## (undated) DEVICE — KIT ANESTHESIA W/CIRCUIT & 3/LT BAG W/FILTER (20EA/CA)

## (undated) DEVICE — GLOVE BIOGEL PI INDICATOR SZ 8.5 SURGICAL PF LF - (50PR/BX 4BX/CA)

## (undated) DEVICE — DRAPE C ARMOR (12EA/CA)

## (undated) DEVICE — BLADE SAW SMALL BONE 39.0 X 13.0 X 0.38MM